# Patient Record
Sex: FEMALE | Race: BLACK OR AFRICAN AMERICAN | NOT HISPANIC OR LATINO | Employment: FULL TIME | URBAN - METROPOLITAN AREA
[De-identification: names, ages, dates, MRNs, and addresses within clinical notes are randomized per-mention and may not be internally consistent; named-entity substitution may affect disease eponyms.]

---

## 2017-02-12 PROCEDURE — 93005 ELECTROCARDIOGRAM TRACING: CPT

## 2017-02-12 PROCEDURE — 99284 EMERGENCY DEPT VISIT MOD MDM: CPT

## 2017-02-13 ENCOUNTER — HOSPITAL ENCOUNTER (EMERGENCY)
Facility: HOSPITAL | Age: 22
Discharge: HOME | End: 2017-02-13
Attending: EMERGENCY MEDICINE
Payer: MEDICAID

## 2017-02-13 VITALS
OXYGEN SATURATION: 99 % | DIASTOLIC BLOOD PRESSURE: 65 MMHG | TEMPERATURE: 98 F | HEART RATE: 84 BPM | WEIGHT: 236 LBS | BODY MASS INDEX: 37.04 KG/M2 | HEIGHT: 67 IN | SYSTOLIC BLOOD PRESSURE: 114 MMHG | RESPIRATION RATE: 17 BRPM

## 2017-02-13 DIAGNOSIS — R07.9 CHEST PAIN, UNSPECIFIED TYPE: Primary | ICD-10-CM

## 2017-02-13 RX ORDER — PANTOPRAZOLE SODIUM 20 MG/1
20 TABLET, DELAYED RELEASE ORAL DAILY
Qty: 7 TABLET | Refills: 1 | Status: SHIPPED | OUTPATIENT
Start: 2017-02-13 | End: 2017-06-12

## 2017-02-13 NOTE — ED NOTES
Rounding on the patient has been done. she has been updated on the plan of care and her current status. Pain was assessed and is currently a 6/10. Comfort positioning and restroom needs were addressed. Necessary items were placed with in her reach and she was advised when a reassessment would take place. The call bell remains at the bedside for any additional patient needs. The patient is resting comfortably on the stretcher, respirations are even and unlabored, skin warm and dry. Will continue to monitor.

## 2017-02-13 NOTE — ED PROVIDER NOTES
"Encounter Date: 2/12/2017    SCRIBE #1 NOTE: I, Obed Scruggs MD, am scribing for, and in the presence of,  Kiarra Tavera. I have scribed the following portions of the note - Other sections scribed: HPI/ROS.       History     Chief Complaint   Patient presents with    Chest Pain     left cw pain x 1 week, states laying down and move certain ways it gets worse.  Denies sob.      Review of patient's allergies indicates:  No Known Allergies  HPI Comments: CC: Chest Pain    HPI: 21 y.o. F with no PMHx presents to the ED c/o moderate, intermittent, mid-sternal CP for the past 1 week. Pain is described as a "pressure". Pain is exacerbated with certain movements such as laying on her L side. Pt took Tums with no relief. Pt reports she is still on birth control. Pt denies diaphoresis, fever, chills, SOB, abdominal pain, and N/V. Pt does not smoke.    The history is provided by the patient.     History reviewed. No pertinent past medical history.  Past Medical History Pertinent Negatives   Diagnosis Date Noted    Asthma 8/31/2015    Diabetes mellitus 8/31/2015    Hypertension 8/31/2015    Seizures 8/31/2015     History reviewed. No pertinent past surgical history.  Family History   Problem Relation Age of Onset    No Known Problems Mother     No Known Problems Father     Diabetes Brother     Cancer Maternal Grandmother     Diabetes Maternal Grandmother     Hypertension Maternal Grandmother     Breast cancer Neg Hx     Colon cancer Neg Hx     Ovarian cancer Neg Hx      Social History   Substance Use Topics    Smoking status: Never Smoker    Smokeless tobacco: Never Used    Alcohol use No     Review of Systems   Constitutional: Negative for chills, diaphoresis and fever.   HENT: Negative for congestion.    Eyes: Negative for pain.   Respiratory: Negative for cough and shortness of breath.    Cardiovascular: Positive for chest pain.   Gastrointestinal: Negative for abdominal pain, nausea and vomiting. "   Genitourinary: Negative for dysuria.   Musculoskeletal: Negative for back pain.   Skin: Negative for rash.   Neurological: Negative for headaches.       Physical Exam   Initial Vitals   BP Pulse Resp Temp SpO2   02/12/17 2348 02/12/17 2348 02/12/17 2348 02/12/17 2348 02/12/17 2348   111/58 83 17 98 °F (36.7 °C) 99 %     Physical Exam    Nursing note and vitals reviewed.  Constitutional: She appears well-developed and well-nourished. She is not diaphoretic. No distress.   HENT:   Head: Normocephalic and atraumatic.   Nose: Nose normal.   Mouth/Throat: Oropharynx is clear and moist. No oropharyngeal exudate.   Eyes: Conjunctivae and EOM are normal. Pupils are equal, round, and reactive to light. No scleral icterus.   Neck: Normal range of motion. Neck supple. No thyromegaly present. No tracheal deviation present.   Cardiovascular: Normal rate, regular rhythm and normal heart sounds. Exam reveals no gallop and no friction rub.    No murmur heard.  Pulmonary/Chest: Breath sounds normal. No respiratory distress. She has no wheezes. She has no rhonchi. She has no rales.   Abdominal: Soft. Bowel sounds are normal. She exhibits no distension and no mass. There is no tenderness. There is no rebound and no guarding.   Musculoskeletal: Normal range of motion. She exhibits no edema or tenderness.   Lymphadenopathy:     She has no cervical adenopathy.   Neurological: She is alert and oriented to person, place, and time. She has normal strength. No cranial nerve deficit or sensory deficit.   Skin: Skin is warm and dry. No rash noted. No erythema. No pallor.   Psychiatric: She has a normal mood and affect. Her behavior is normal. Thought content normal.         ED Course   Procedures  Labs Reviewed   POCT URINE PREGNANCY             Medical Decision Making:   Initial Assessment:   21-year-old male presents with upper midsternal chest pain for the last week that is exacerbated with movement and also worse at night.  She says that  sometimes this pain seems to radiate into the neck and feels like a sense of fullness, pressure, or like something stuck in her throat.  Physical examination is completely normal.  No evidence of DVT, no hypoxia, tachycardia, nl cardiopulmonary exam  Differential Diagnosis:   Very low likelihood of acute coronary syndrome, pulmonary embolism.  Will obtain screening EKG and chest x-ray.  Independently Interpreted Test(s):   I have ordered and independently interpreted X-rays - see summary below.       <> Summary of X-Ray Reading(s): Chest x-ray: No acute abnormality  I have ordered and independently interpreted EKG Reading(s) - see summary below       <> Summary of EKG Reading(s):  NSR, nl axis, nl intervals, no definite acute ischemic changes  ED Management:  Patient's workup is unremarkable.  Patient's description of her symptoms sound most similar to acid reflux.  Will prescribe PPI and recommend follow-up with primary physician. Patient counseled regarding test results, recommendations for supportive care, and need for follow-up.  Return precautions given.              Scribe Attestation:   Scribe #1: I performed the above scribed service and the documentation accurately describes the services I performed. I attest to the accuracy of the note.    Attending Attestation:           Physician Attestation for Scribe:  Physician Attestation Statement for Scribe #1: I, Obed Scruggs MD, reviewed documentation, as scribed by Kiarra Tavera in my presence, and it is both accurate and complete.                 ED Course     Clinical Impression:   The encounter diagnosis was Chest pain, unspecified type.          Obed Scruggs III, MD  02/13/17 0737

## 2017-02-13 NOTE — ED TRIAGE NOTES
Pt. Presents to ED with c/o midsteral chest pain that began a week ago, denies NVD, rates pain 6/10.

## 2017-02-13 NOTE — DISCHARGE INSTRUCTIONS
Return to the emergency department if you develop worsening pain, difficulty breathing, fainting, or for any new or worsening medical concerns.

## 2017-02-13 NOTE — ED AVS SNAPSHOT
[facility]  [address] [place] [address]               Jaimie Francis   2017  3:07 AM   ED    Description:  Female : 1995   Department:  [facility]           Your Care was Coordinated By:     Provider Role From To    Obed Scruggs III, MD Attending Provider 17 0312 --      Reason for Visit     Chest Pain           Diagnoses this Visit        Comments    Chest pain, unspecified type    -  Primary       ED Disposition     ED Disposition Condition Comment    Discharge             To Do List           Follow-up Information     Follow up with Uday Mendoza III, MD In 1 week.    Specialty:  Internal Medicine    Contact information:    [address]  [place] [facility]  [place] [id number] [phone]         These Medications        Disp Refills Start End    pantoprazole (PROTONIX) 20 MG tablet 7 tablet 1 2017    Take 1 tablet (20 mg total) by mouth once daily. - Oral    Pharmacy: [facility] #[id number] - [place] - [address] Ph #: [phone]         [facility] On Call     [facility] Care Line -  Assistance  Registered nurses in the [facility] provide clinical advisement, health education, appointment booking, and other advisory services.  Call for this free service at [phone].             Medications           Message regarding Medications     Verify the changes and/or additions to your medication regime listed below are the same as discussed with your clinician today.  If any of these changes or additions are incorrect, please notify your healthcare provider.        START taking these NEW medications        Refills    pantoprazole (PROTONIX) 20 MG tablet 1    Sig: Take 1 tablet (20 mg total) by mouth once daily.    Class: Print    Route: Oral           Verify that the below list of medications is an accurate representation of the medications you are currently  "taking.  If none reported, the list may be blank. If incorrect, please contact your healthcare provider. Carry this list with you in case of emergency.           Current Medications     levonorgestrel-ethinyl estradiol (SRONYX) 0.1-20 mg-mcg per tablet Take 1 tablet by mouth once daily.    pantoprazole (PROTONIX) 20 MG tablet Take 1 tablet (20 mg total) by mouth once daily.    phentermine (ADIPEX-P) 37.5 mg tablet Take 18.75 mg by mouth 2 (two) times daily.           Clinical Reference Information           Your Vitals Were     BP Pulse Temp Resp Height Weight    115/69 64 97.6 °F (36.4 °C) (Oral) 17 5' 7" (1.702 m) 107 kg (236 lb)    Last Period SpO2 BMI          01/03/2017 98% 36.96 kg/m2        Allergies as of 2/13/2017     No Known Allergies      Immunizations Administered on Date of Encounter - 2/13/2017     None      ED Micro, Lab, POCT     Start Ordered       Status Ordering Provider    02/13/17 0007 02/13/17 0006  POCT urine pregnancy  Once      Acknowledged       ED Imaging Orders     Start Ordered       Status Ordering Provider    02/13/17 0027 02/13/17 0026  X-Ray Chest PA And Lateral  1 time imaging      Final result         Discharge Instructions       Return to the emergency department if you develop worsening pain, difficulty breathing, fainting, or for any new or worsening medical concerns.         Discharge References/Attachments     ACID REFLUX, TIPS TO CONTROL (ENGLISH)    GERD (ADULT) (ENGLISH)      [facility] Sign-Up     Activating your [facility] account is as easy as 1-2-3!     1) Visit [email or web address], select Sign Up Now, enter this activation code and your date of birth, then select Next.  [email or web address]  Expires: 3/30/2017  5:39 AM      2) Create a username and password to use when you visit [facility] in the future and select a security question in case you lose your password and select Next.    3) Enter your e-mail address and click Sign Up!    Additional Information  If you have " questions, please e-mail [email or web address] or call [phone] to talk to our [facility] staff. Remember, [facility] is NOT to be used for urgent needs. For medical emergencies, dial 911.          [facility] complies with applicable Federal civil rights laws and does not discriminate on the basis of race, color, national origin, age, disability, or sex.        Language Assistance Services     ATTENTION: Language assistance services are available, free of charge. Please call [phone].      ATENCIÓN: Si habla español, tiene a reese disposición servicios gratuitos de asistencia lingüística. Llame al [phone].     CHÚ Ý: N?u b?n nói Ti?ng Vi?t, có các d?ch v? h? tr? ngôn ng? mi?n phí dành cho b?n. G?i s? [phone].

## 2017-06-12 PROBLEM — Z91.89 AT RISK FOR INFECTIOUS DISEASE DUE TO RECENT FOREIGN TRAVEL: Status: ACTIVE | Noted: 2017-06-12

## 2017-06-13 ENCOUNTER — TELEPHONE (OUTPATIENT)
Dept: MATERNAL FETAL MEDICINE | Facility: CLINIC | Age: 22
End: 2017-06-13

## 2017-06-13 DIAGNOSIS — Z91.89 AT RISK FOR INFECTIOUS DISEASE DUE TO RECENT FOREIGN TRAVEL: ICD-10-CM

## 2017-06-13 DIAGNOSIS — Z36.82 ENCOUNTER FOR NUCHAL TRANSLUCENCY TESTING: Primary | ICD-10-CM

## 2017-06-13 NOTE — TELEPHONE ENCOUNTER
See previously documented note.    ----- Message from Safia Stephens sent at 6/13/2017 12:49 PM CDT -----  Contact: self  Pt returned Rachel's call. Please contact her at [phone].    Thanks

## 2017-06-13 NOTE — TELEPHONE ENCOUNTER
Nurse returning patient's phone call to discuss scheduling [facility] consult requested for recent foreign travel.    Patient scheduled for appointment at [facility] on Monday 07/10/17 at 8:00 am. Patient is interested in genetic screenings so Nuchal Translucency ordered.    Patient verbalized understanding of all information received.

## 2017-10-05 DIAGNOSIS — O26.90 PREGNANCY, COMPLICATIONS OF: Primary | ICD-10-CM

## 2017-10-12 ENCOUNTER — HOSPITAL ENCOUNTER (OUTPATIENT)
Dept: PERINATAL CARE | Facility: HOSPITAL | Age: 22
Discharge: HOME | End: 2017-10-12
Attending: OBSTETRICS & GYNECOLOGY
Payer: MEDICAID

## 2017-10-12 DIAGNOSIS — O30.002 TWIN PREGNANCY, TWINS DISCORDANT IN SECOND TRIMESTER, FETUS 1 OF MULTIPLE GESTATION: ICD-10-CM

## 2017-10-12 DIAGNOSIS — O26.90 PREGNANCY, COMPLICATIONS OF: ICD-10-CM

## 2017-10-12 DIAGNOSIS — O30.039 MONOCHORIONIC DIAMNIOTIC TWIN PREGNANCY, ANTEPARTUM: Primary | ICD-10-CM

## 2017-10-12 DIAGNOSIS — O26.90 PREGNANCY, COMPLICATIONS OF: Primary | ICD-10-CM

## 2017-10-12 DIAGNOSIS — O36.5921 TWIN PREGNANCY, TWINS DISCORDANT IN SECOND TRIMESTER, FETUS 1 OF MULTIPLE GESTATION: ICD-10-CM

## 2017-10-12 DIAGNOSIS — Z36.89 ENCOUNTER FOR FETAL ANATOMIC SURVEY: ICD-10-CM

## 2017-10-12 DIAGNOSIS — O30.032 MONOCHORIONIC DIAMNIOTIC TWIN GESTATION IN SECOND TRIMESTER: ICD-10-CM

## 2017-10-12 DIAGNOSIS — O40.2XX2 POLYHYDRAMNIOS IN SECOND TRIMESTER, FETUS 2 OF MULTIPLE GESTATION: ICD-10-CM

## 2017-10-12 PROCEDURE — 76812 OB US DETAILED ADDL FETUS: CPT | Mod: 26,,, | Performed by: OBSTETRICS & GYNECOLOGY

## 2017-10-12 PROCEDURE — 76812 OB US DETAILED ADDL FETUS: CPT

## 2017-10-12 PROCEDURE — 76811 OB US DETAILED SNGL FETUS: CPT | Mod: 26,,, | Performed by: OBSTETRICS & GYNECOLOGY

## 2017-10-12 PROCEDURE — 99203 OFFICE O/P NEW LOW 30 MIN: CPT | Mod: 25,TH,S$PBB, | Performed by: OBSTETRICS & GYNECOLOGY

## 2017-10-12 PROCEDURE — 76811 OB US DETAILED SNGL FETUS: CPT

## 2017-10-13 DIAGNOSIS — O35.9XX1 SUSPECTED FETAL ABNORMALITY AFFECTING MANAGEMENT OF MOTHER, ANTEPARTUM, FETUS 1 OF MULTIPLE GESTATION: Primary | ICD-10-CM

## 2017-10-13 PROBLEM — Z30.09 FAMILY PLANNING: Status: ACTIVE | Noted: 2017-10-13

## 2017-10-13 NOTE — PROGRESS NOTES
"Indication  ========    Consultation. Twin Fetal anatomy survey.    Method  ======    Transabdominal ultrasound examination. View: Good view.    Pregnancy  =========    Twin pregnancy. Monochorionic-diamniotic. Number of fetuses: 2.    Dating  ======    Cycle: regular cycle  GA by "stated dating" 24 w + 5 d  PEDRO by "stated dating": 1/27/2018  Ultrasound examination on: 10/12/2017  GA by U/S based upon: AC, BPD, Femur, HC  GA by U/S 24 w + 1 d  PEDRO by U/S: 1/31/2018  GA by U/S based upon (Fetus 2): AC, BPD, Femur, HC  GA by U/S (Fetus 2) 25 w + 1 d  PEDRO by U/S (Fetus 2): 1/24/2018  Assigned: Dating performed on 10/12/2017, based on the external assessment  Assigned GA 24 w + 5 d  Assigned PEDRO: 1/27/2018    General Evaluation (1)  =================    Cardiac activity: present.  bpm.  Fetal movements: visualized.  Presentation: transverse RLQ.  Placenta: posterior.  Umbilical cord: normal, 3 vessel cord, pci suboptimal (likely close to dividing membrane).  Amniotic fluid: normal amount (mvp 3.9 cm).    Fetal Biometry (1)  ==============    Fetal Biometry  BPD 61.1 mm 24w 6d Hadlock  OFD 79.2 mm 25w 6d Jacki  .3 mm 24w 5d Hadlock  .4 mm 23w 4d Hadlock  Femur 41.4 mm 23w 3d Hadlock  Cerebellum tr 25.3 mm 24w 0d Pedraza  CM 4.6 mm  Humerus 39.5 mm 24w 0d Jacki   g 21% Fernando  Calculated by: Hadlock (BPD-HC-AC-FL)  EFW (lb) 1 lb  EFW (oz) 6 oz  EFW discordance 24.0 %  Cephalic index 0.77  HC / AC 1.21  FL / BPD 0.68  FL / AC 0.22  MVP 3.9 cm   bpm  Head / Face / Neck   3.1 mm  Nasal bone 5.8 mm    Fetal Anatomy (1)  ==============    Cranium: normal  Lateral ventricles: normal  Choroid plexus: normal  Midline falx: normal  Cavum septi pellucidi: normal  Cerebellum: normal  Cisterna magna: normal  Head shape: normal  Rt lateral ventricle: normal  Lt lateral ventricle: normal  Rt choroid plexus: normal  Lt choroid plexus: normal  Parenchyma: normal  Third " ventricle: normal  Posterior fossa: normal  Cerebellar lobes: normal  Vermis: normal  Neck: appears normal  Nuchal fold: normal  Lips: suboptimal  Profile: normal  Nose: suboptimal  Maxilla: normal  Mandible: normal  4-chamber view: suboptimal  RVOT: suboptimal  LVOT: suboptimal  Heart / Thorax: Septal views: Suboptimal  Situs: normal  Aortic arch: suboptimal  Ductal arch: suboptimal  SVC: suboptimal  IVC: suboptimal  3-vessel view: suboptimal  3-vessel-trachea view: suboptimal  Cardiac position: normal  Cardiac axis: normal  Cardiac size: normal  Cardiac rhythm: normal  Rt lung: normal  Lt lung: normal  Diaphragm: normal  Cord insertion: normal  Stomach: normal  Kidneys: suboptimal  Bladder: normal  Genitals: normal  Abdom. wall: appears normal  Abdom. cavity: normal  Rt kidney: suboptimal  Lt kidney: suboptimal  Liver: normal  Bowel: normal  Rt renal artery: normal  Lt renal artery: normal  Cervical spine: suboptimal  Thoracic spine: suboptimal  Lumbar spine: suboptimal  Sacral spine: suboptimal  Rt arm: normal  Lt arm: normal  Rt hand: normal  Lt hand: normal  Rt leg: normal  Lt leg: normal  Rt foot: normal  Lt foot: normal  Position of hands: normal  Position of feet: normal  Gender: male  Wants to know gender: yes    General Evaluation (2)  =================    Cardiac activity: present.  bpm.  Fetal movements: visualized.  Presentation: transverse top.  Placenta: posterior.  Umbilical cord: normal, 3 vessel cord, pci closer to fundus.  Amniotic fluid: polyhydramnios (MVP 11.2).    Fetal Biometry (2)  ==============    Fetal Biometry  BPD 61.5 mm 25w 0d Hadlock  OFD 76.8 mm 25w 1d Jacki  .3 mm 24w 3d Hadlock  .3 mm 26w 1d Hadlock  Femur 44.7 mm 24w 5d Hadlock  Cerebellum tr 27.0 mm 25w 2d Pedraza  CM 4.9 mm  Humerus 46.5 mm 27w 3d Jacki   g 59% Fernando  Calculated by: Hadlock (BPD-HC-AC-FL)  EFW (lb) 1 lb  EFW (oz) 13 oz  EFW discordance 24.0 %  Cephalic index 0.80  HC /  AC 1.03  FL / BPD 0.73  FL / AC 0.21  MVP 11.2 cm   bpm  Head / Face / Neck   3.5 mm    Fetal Anatomy (2)  ==============    Cranium: normal  Lateral ventricles: normal  Choroid plexus: normal  Midline falx: normal  Cavum septi pellucidi: normal  Cerebellum: normal  Cisterna magna: normal  Head shape: normal  Lt lateral ventricle: normal  Rt choroid plexus: normal  Lt choroid plexus: normal  Parenchyma: normal  Third ventricle: normal  Posterior fossa: normal  Cerebellar lobes: normal  Vermis: normal  Neck: appears normal  Nuchal fold: normal  Lips: normal  Profile: suboptimal  Nose: normal  Maxilla: suboptimal  Mandible: suboptimal  4-chamber view: suboptimal  RVOT: suboptimal  LVOT: suboptimal  Heart / Thorax: Septal views: Suboptimal  Situs: suboptimal  Aortic arch: normal  Ductal arch: suboptimal  SVC: suboptimal  IVC: suboptimal  3-vessel view: suboptimal  3-vessel-trachea view: suboptimal  Cardiac position: suboptimal  Cardiac axis: suboptimal  Cardiac size: suboptimal  Cardiac rhythm: normal  Rt lung: normal  Lt lung: normal  Diaphragm: normal  Cord insertion: normal  Stomach: normal  Kidneys: suboptimal  Bladder: normal  Abdom. wall: appears normal  Abdom. cavity: normal  Rt kidney: suboptimal  Lt kidney: suboptimal  Liver: normal  Bowel: normal  Rt renal artery: normal  Lt renal artery: normal  Cervical spine: normal  Thoracic spine: normal  Lumbar spine: normal  Sacral spine: normal  Rt arm: normal  Lt arm: normal  Rt hand: normal  Lt hand: normal  Rt leg: normal  Lt leg: normal  Rt foot: normal  Lt foot: normal  Position of hands: normal  Position of feet: normal  Gender: male  Wants to know gender: yes    Maternal Structures  ===============    Uterus / Cervix  Uterus: Normal  Cervical length 43.0 mm  Ovaries / Tubes / Adnexa  Rt ovary: Suboptimal  Lt ovary: Suboptimal  Pouch of Lalit / Other Structures  Cul de Sac: Visualized  Other: Uterus normal, adnexa  suboptimal    Consultation  ==========    Type: Mono/di twins.  Ms. Francis is a 23y/o  at 24 and 5 weeks based on an early ultrasound. She denies any pregnancy related complications.  She traveled to [place] in January and was tested for Zika in  with a PCR and IgM both of which were negative. She has had no other  exposure, nor has her spouse.    PMHx: Rh negative  PSHx: None  Social: Denies smoking, alcohol, illicit drug use  Meds:PNV  PObHx: 1 FTSVD (just over 8 pounds), 1   Family Hx: no history of birth defects or sickle cell disease; her grandmother had some type of cancer, diabetes, HTN  Sequential screen negative for ONTD or Trisomy 21    /65, hbg elec normal    A/P:1. IUP at 24 and 5    2. Mono/di twins:On ultrasound today a twin gestation is noted with the intertwin membrane is consistent with  monochorionic-diamniotic twin gestation. Today I discussed with the patient the finding of monochorionic-diamniotic twin pregnancy and the  risks associated with this type of pregnancy. I counseled the patient with the risks associated with twin pregnancy. These include but are not  limited to  labor and delivery,gestational diabetes, preeclampsia, IUGR and/or discordant twins, malpresentation, congenital anomalies,  postpartum hemorrhage and  delivery. The patient voiced understanding of these risks. We discussed that a twin gestation increases  the risk of nearly all  pregnancy related complications. We also discussed the increased risk of cerebral palsy in multiple gestations.  Because of the finding of a monochorionic placenta, I also discussed the 10-15% risk of twin to twin transfusion syndrome (TTTS). I reviewed  with the patient typically the need for fetal ultrasound assessment for TTTS surveillance every two weeks starting at 16-17 weeks gestation  until delivery,  with growth every 4 weeks. A detailed fetal anatomic survey is recommended. A fetal  echo is also  recommended.   fetal surveillance is recommended beginning at 32 weeks with twice weekly NSTs and weekly MVP or earlier if indicated.  I also reviewed with the patient our MFM group recommendations delivery at 37 weeks gestation because of the increasing risk for poor  pregnancy outcomes in monochorionic-diamniotic twins that progress past this gestational age. At times, earlier delivery is warranted.  Given the multiple gestation, I would recommend increasing her folic acid supplementation by at least another 1mg daily and consideration of  a dosage of 4mg total daily.  To reduce the risk of preeclampsia, I would recommend baby aspirin 81mg PO daily after 12-14 weeks of gestation.    Today, her twins are discordant although neither has IUGR. There is polyhydramnios of fetus B but a normal fluid volume for fetus A. We  discussed the need to monitor the fetuses closely for any signs of evolution of TTTS or selective IUGR. We discussed that currently she does  not meet criteria for TTTS but we do become more concerned with discrepant amniotic fluid volumes and significant discordance. We  discussed the potential need for earlier delivery and increased fetal surveillance. We also briefly reviewed treatment strategies of TTTS. Our  staff is also working on attempting to obtain an echo appointment for her. We also discussed the potential for compromise of the second fetus  if one fetus of a monochorionic/diamniotic pair results in demise. The patient is aware that at times individuals with twin gestation require  hospital admission.    3. Rh negative. Recommend Rhogam at that appropriate time frame.    4. Polyhydramnios: This can occur for various reasons including but not limited to diabetes, idiopathic causes, chromosomal abnormalities,  genetic disorders, structural malformations, neuromuscular conditions, infections, and other conditions. There is a higher risk of   delivery, PPROM, abruption and  adverse pregnancy outcomes. Recommend GDM screening.    Dr. Rosario was notified.    A total of 30 minutes was spent in face to face time with greater than half of that time spent in counseling and coordination of care.            Impression  =========    Live monochorionic/diamniotic twin intrauterine gestation.  Fetus A with normal amniotic fluid volume. Fetus B with polyhydramnios.  Fetus A with EFW at the 21st percentile. Fetus B with EFW at the 59th percentile.  Discordance of 24%.  Much of the fetal anatomy was suboptimally visualized. The fetal anatomy that was seen appeared normal.  Both fetal bladders and stomachs were visualized and there is no evidence of fetal hydrops noted. No current evidence of TTTS.  Placenta is posterior without previa.  Transabdominal cervical length is normal.      Recommendation  ==============    Follow up ultrasound next week due to fluid discrepancy to confirm no evolution of TTTS. Dopplers if needed.  GDM screen with primary ob.  Fetal echo (MFM is scheduling).

## 2017-10-18 ENCOUNTER — OFFICE VISIT (OUTPATIENT)
Dept: PEDIATRIC CARDIOLOGY | Facility: CLINIC | Age: 22
End: 2017-10-18
Attending: PEDIATRICS
Payer: MEDICAID

## 2017-10-18 ENCOUNTER — CLINICAL SUPPORT (OUTPATIENT)
Dept: PEDIATRIC CARDIOLOGY | Facility: CLINIC | Age: 22
End: 2017-10-18
Payer: MEDICAID

## 2017-10-18 ENCOUNTER — OFFICE VISIT (OUTPATIENT)
Dept: MATERNAL FETAL MEDICINE | Facility: CLINIC | Age: 22
End: 2017-10-18
Payer: MEDICAID

## 2017-10-18 VITALS
HEIGHT: 67 IN | DIASTOLIC BLOOD PRESSURE: 70 MMHG | SYSTOLIC BLOOD PRESSURE: 120 MMHG | BODY MASS INDEX: 38.65 KG/M2 | WEIGHT: 246.25 LBS | HEART RATE: 105 BPM

## 2017-10-18 DIAGNOSIS — O30.039 MONOCHORIONIC DIAMNIOTIC TWIN PREGNANCY, ANTEPARTUM: ICD-10-CM

## 2017-10-18 DIAGNOSIS — O26.90 PREGNANCY, COMPLICATIONS OF: ICD-10-CM

## 2017-10-18 DIAGNOSIS — O35.9XX1 SUSPECTED FETAL ABNORMALITY AFFECTING MANAGEMENT OF MOTHER, ANTEPARTUM, FETUS 1 OF MULTIPLE GESTATION: ICD-10-CM

## 2017-10-18 DIAGNOSIS — O30.032 MONOCHORIONIC DIAMNIOTIC TWIN GESTATION IN SECOND TRIMESTER: Primary | ICD-10-CM

## 2017-10-18 PROCEDURE — 76827 ECHO EXAM OF FETAL HEART: CPT | Mod: PBBFAC | Performed by: PEDIATRICS

## 2017-10-18 PROCEDURE — 76811 OB US DETAILED SNGL FETUS: CPT | Mod: PBBFAC | Performed by: OBSTETRICS & GYNECOLOGY

## 2017-10-18 PROCEDURE — 99212 OFFICE O/P EST SF 10 MIN: CPT | Mod: S$PBB,25,TH, | Performed by: OBSTETRICS & GYNECOLOGY

## 2017-10-18 PROCEDURE — 99203 OFFICE O/P NEW LOW 30 MIN: CPT | Mod: 25,S$PBB,, | Performed by: PEDIATRICS

## 2017-10-18 PROCEDURE — 99212 OFFICE O/P EST SF 10 MIN: CPT | Mod: PBBFAC,25 | Performed by: PEDIATRICS

## 2017-10-18 PROCEDURE — 76816 OB US FOLLOW-UP PER FETUS: CPT | Mod: 59,PBBFAC | Performed by: OBSTETRICS & GYNECOLOGY

## 2017-10-18 PROCEDURE — 99999 PR PBB SHADOW E&M-EST. PATIENT-LVL II: CPT | Mod: PBBFAC,,, | Performed by: PEDIATRICS

## 2017-10-18 PROCEDURE — 76827 ECHO EXAM OF FETAL HEART: CPT | Mod: 26,S$PBB,, | Performed by: PEDIATRICS

## 2017-10-18 PROCEDURE — 93325 DOPPLER ECHO COLOR FLOW MAPG: CPT | Mod: 59,PBBFAC | Performed by: PEDIATRICS

## 2017-10-18 PROCEDURE — 76825 ECHO EXAM OF FETAL HEART: CPT | Mod: 26,S$PBB,, | Performed by: PEDIATRICS

## 2017-10-18 PROCEDURE — 76825 ECHO EXAM OF FETAL HEART: CPT | Mod: 59,PBBFAC | Performed by: PEDIATRICS

## 2017-10-18 PROCEDURE — 76812 OB US DETAILED ADDL FETUS: CPT | Mod: PBBFAC | Performed by: OBSTETRICS & GYNECOLOGY

## 2017-10-18 PROCEDURE — 76816 OB US FOLLOW-UP PER FETUS: CPT | Mod: 26,S$PBB,, | Performed by: OBSTETRICS & GYNECOLOGY

## 2017-10-18 PROCEDURE — 93325 DOPPLER ECHO COLOR FLOW MAPG: CPT | Mod: 26,S$PBB,, | Performed by: PEDIATRICS

## 2017-10-18 NOTE — LETTER
October 21, 2017      Kelsey Oglesby MD  [address]  [address]  [place] [address]           [facility]  [address]  [facility] [address]  Phone: [phone]          Patient: Jaimie Francis   MR Number: [id number]   YOB: 1995   Date of Visit: 10/18/2017       Dear Dr. Kelsey Oglesby:    Thank you for referring Jaimie Francis to me for evaluation. Attached you will find relevant portions of my assessment and plan of care.    If you have questions, please do not hesitate to call me. I look forward to following Jaimie Francis along with you.    Sincerely,    Kelsey Cheatham MD    Enclosure  CC:  No Recipients    If you would like to receive this communication electronically, please contact [email or web address] or [phone] to request more information on [facility] Link access.    For providers and/or their staff who would like to refer a patient to [facility], please contact us through our one-stop-shop provider referral line, [facility], at [phone].    If you feel you have received this communication in error or would no longer like to receive these types of communications, please e-mail [email or web address]

## 2017-10-21 NOTE — PROGRESS NOTES
"Indication  ========    TTTS/check.    History  ======    General History  Height 165 cm  Height (ft) 5 ft  Height (in) 5 in  Other: OB: DESIREE SHOOK  Risk Factors  History risk factors: Multiple gestation  Details: mo-di twins    Pregnancy History  ==============    Maternal Lab Tests  Test: Sequential Screen part 2  Result: negative DSR <1:5000 (age DSR 1:1100)  Wants to know gender: yes    Maternal Assessment  =================    Weight 111 kg  Weight (lb) 245 lb  Height 165 cm  Height (ft) 5 ft  Height (in) 5 in  BP syst 120 mmHg  BP diast 72 mmHg  BMI 40.72 kg/m²    Method  ======    Transabdominal ultrasound examination, [place] E10. View: Sufficient.    Pregnancy  =========    Twin pregnancy. Monochorionic-diamniotic. Number of fetuses: 2.    Dating  ======    Cycle: regular cycle  GA by "stated dating" 25 w + 4 d  PEDRO by "stated dating": 1/27/2018  Assigned: Dating performed on 10/12/2017, based on the external assessment  Assigned GA 25 w + 4 d  Assigned PEDRO: 1/27/2018    General Evaluation (1)  =================    Cardiac activity:  bpm.  Fetal movements: visualized.  Presentation: cephalic maternal left, presenting twin.  Placenta:  Placental site: posterior.  Umbilical cord: Cord vessels: 3 vessel cord.  Amniotic fluid: Amount of AF: normal amount. MVP 4.5 cm. ESTRELLITA 4.2 cm. Q1 4.2 cm.    Fetal Biometry (1)  ==============    Fetal Biometry  Calculated by: Hadlock (BPD-HC-AC-FL)  MVP 4.5 cm  ESTRELLITA 4.2 cm   bpm    Fetal Anatomy (1)  ==============    RVOT: suboptimal  LVOT: suboptimal  Heart / Thorax: Fetal echo done 10/18/17  4-chamber view: septum normal, 4-chamber normal  Aortic arch: suboptimal  Ductal arch: suboptimal  SVC: suboptimal  IVC: suboptimal  3-vessel view: suboptimal  3-vessel-trachea view: suboptimal  Stomach: visualized  Kidneys: visualized  Bladder: visualized  Cervical spine: suboptimal  Thoracic spine: suboptimal  Lumbar spine: suboptimal  Sacral " spine: suboptimal  Gender: male  Wants to know gender: yes  Other: A full anatomic survey has been previously performed.    General Evaluation (2)  =================    Cardiac activity: present.  bpm.  Fetal movements: visualized.  Presentation: breech oblique maternal RUQ.  Placenta:  Placental site: posterior.  Umbilical cord: Cord vessels: 3 vessel cord.  Amniotic fluid: MVP 8.8 cm. ESTRELLITA 4.2 cm. Q1 4.2 cm.    Fetal Biometry (2)  ==============    Fetal Biometry  Calculated by: Hadlock (BPD-HC-AC-FL)  MVP 8.8 cm  ESTRELLITA 4.2 cm   bpm    Fetal Anatomy (2)  ==============    Profile: normal  Maxilla: normal  Mandible: normal  RVOT: suboptimal  LVOT: suboptimal  Heart / Thorax: Fetal echo done 10/18/17  4-chamber view: 4-chamber suboptimal, septum suboptimal  Situs: normal  Aortic arch: suboptimal  Ductal arch: suboptimal  SVC: suboptimal  IVC: suboptimal  3-vessel view: suboptimal  3-vessel-trachea view: suboptimal  Cardiac axis: normal  Cardiac size: normal  Stomach: visualized  Kidneys: visualized  Bladder: visualized  Genitals: normal  Gender: male  Wants to know gender: yes  Other: A full anatomic survey has been previously performed.    Consultation  ==========    We discussed the findings of today's ultrasound.  There is no evidence of TTTS, but the growth is discordant and Twin B has polyhydramnios.  We discussed close monitoring with weekly ultrasound to assess for evolution of possible TTTS.  She has been previously counseled regarding selective IUGR, polyhydramnios, TTTS, and the complications if one pair of a MC/DA twin suffers  demise.  Recommend GDM screen with primary OB.  Per patient, fetal echocardiograms performed today. Could not rule out VSD for twin B. Has follow-up scheduled with pediatric cardiology.  Time  I overall spent approximately 10 minutes in face to face time with the patient, greater than 50% of which was in counseling and care  coordination.    Impression  =========    F/u  TTTS surveillance for monochorionic diamniotic twins with discordant growth  Twin A  Imaging of the cardiac structures, spine, and face remain suboptimal due to fetal position.  The bladder was visualized. The AFV is normal.  Twin B  Imaging of the cardiac structures was suboptimal. The remainder of the previously suboptimal anatomy was seen today and appears normal.  The bladder was visualized. There is mild polyhydramnios.  No evidence of TTTS.    Recommendation  ==============    Recommend repeat ultrasound in 1 week for TTTS surveillance.

## 2017-10-27 ENCOUNTER — OFFICE VISIT (OUTPATIENT)
Dept: MATERNAL FETAL MEDICINE | Facility: CLINIC | Age: 22
End: 2017-10-27
Payer: MEDICAID

## 2017-10-27 VITALS
WEIGHT: 246.69 LBS | SYSTOLIC BLOOD PRESSURE: 118 MMHG | DIASTOLIC BLOOD PRESSURE: 70 MMHG | BODY MASS INDEX: 38.63 KG/M2

## 2017-10-27 DIAGNOSIS — O30.039 MONOCHORIONIC DIAMNIOTIC TWIN PREGNANCY, ANTEPARTUM: ICD-10-CM

## 2017-10-27 PROCEDURE — 76816 OB US FOLLOW-UP PER FETUS: CPT | Mod: 59,PBBFAC | Performed by: OBSTETRICS & GYNECOLOGY

## 2017-10-27 PROCEDURE — 99212 OFFICE O/P EST SF 10 MIN: CPT | Mod: S$PBB,25,TH, | Performed by: OBSTETRICS & GYNECOLOGY

## 2017-10-27 PROCEDURE — 76812 OB US DETAILED ADDL FETUS: CPT | Mod: 26,S$PBB,, | Performed by: OBSTETRICS & GYNECOLOGY

## 2017-10-27 PROCEDURE — 76811 OB US DETAILED SNGL FETUS: CPT | Mod: PBBFAC | Performed by: OBSTETRICS & GYNECOLOGY

## 2017-10-27 PROCEDURE — 76811 OB US DETAILED SNGL FETUS: CPT | Mod: 26,S$PBB,, | Performed by: OBSTETRICS & GYNECOLOGY

## 2017-10-27 PROCEDURE — 76812 OB US DETAILED ADDL FETUS: CPT | Mod: PBBFAC | Performed by: OBSTETRICS & GYNECOLOGY

## 2017-10-27 PROCEDURE — 99212 OFFICE O/P EST SF 10 MIN: CPT | Mod: PBBFAC | Performed by: OBSTETRICS & GYNECOLOGY

## 2017-10-27 PROCEDURE — 99999 PR PBB SHADOW E&M-EST. PATIENT-LVL II: CPT | Mod: PBBFAC,,, | Performed by: OBSTETRICS & GYNECOLOGY

## 2017-10-28 NOTE — PROGRESS NOTES
"Indication  ========    TTTS surveillence.    History  ======    General History  Height 165 cm  Height (ft) 5 ft  Height (in) 5 in  Other: OB: DESIREE SHOOK  Risk Factors  History risk factors: Multiple gestation  Details: mo-di twins    Pregnancy History  ==============    Maternal Lab Tests  Test: Sequential Screen part 2  Result: negative DSR <1:5000 (age DSR 1:1100)  Wants to know gender: yes    Maternal Assessment  =================    Weight 111 kg  Weight (lb) 245 lb  Height 165 cm  Height (ft) 5 ft  Height (in) 5 in  BP syst 118 mmHg  BP diast 70 mmHg  BMI 40.72 kg/m²    Method  ======    Transabdominal ultrasound examination. View: Sufficient.    Pregnancy  =========    Twin pregnancy. Monochorionic-diamniotic. Number of fetuses: 2.    Dating  ======    Cycle: regular cycle  GA by "stated dating" 26 w + 6 d  PEDRO by "stated dating": 1/27/2018  Assigned: Dating performed on 10/12/2017, based on the external assessment  Assigned GA 26 w + 6 d  Assigned PEDRO: 1/27/2018    General Evaluation (1)  =================    Cardiac activity: present.  bpm.  Fetal movements: visualized.  Presentation: cephalic/ mat rlq.  Placenta: posterior.  Umbilical cord: 3 vessel cord.  Amniotic fluid: MVP 2.9 cm.    Fetal Biometry (1)  ==============    Fetal Biometry  Calculated by: Hadlock (BPD-HC-AC-FL)  MVP 2.9 cm   bpm    Fetal Anatomy (1)  ==============    4-chamber view: normal  Stomach: normal  Kidneys: normal  Bladder: normal  Gender: male  Wants to know gender: yes    General Evaluation (2)  =================    Cardiac activity: present.  bpm.  Fetal movements: visualized.  Presentation: cephalic/ mat luq.  Amniotic fluid: MVP 9.2 cm.    Fetal Biometry (2)  ==============    Fetal Biometry  Calculated by: Hadgerardo (BPD-HC-AC-FL)  MVP 9.2 cm   bpm  Head / Face / Neck   3.3 mm    Fetal Anatomy " (2)  ==============    Cranium: normal  Stomach: normal  Kidneys: normal  Bladder: normal  Gender: male  Wants to know gender: yes    Consultation  ==========    Return MD visit  Reports contractions yesterday, more than 6 an hour and vaginal pressure. No LOF or Vb.  SVE closed.    Discussed risks of twins with discordant growth including risk of TTTS or selective IUGR. No evidence of IUGR currently, discussed needs  growth scan in 1 week to re-evaluate and will  accordingly.    Has follow-up fetal echocardiogram scheduled.    Time  I overall spent approximately 10 minutes in face to face time with the patient, greater than 50% of which was in counseling and care  coordination.    Impression  =========    F/u TTTS surveillance for monochorionic diamniotic twins with discordant growth  Twin A  The anatomic survey remains incomplete due to fetal position.  The bladder was visualized. The MVP is normal.  Twin B  The anatomic survey remains incomplete due to fetal position.  The bladder was visualized. There is mild polyhydramnios.  No evidence of TTTS.    Recommendation  ==============    Repeat growth scan in 1 week.

## 2017-11-03 ENCOUNTER — OFFICE VISIT (OUTPATIENT)
Dept: MATERNAL FETAL MEDICINE | Facility: CLINIC | Age: 22
End: 2017-11-03
Payer: MEDICAID

## 2017-11-03 VITALS
DIASTOLIC BLOOD PRESSURE: 70 MMHG | SYSTOLIC BLOOD PRESSURE: 130 MMHG | WEIGHT: 244.06 LBS | BODY MASS INDEX: 38.21 KG/M2

## 2017-11-03 DIAGNOSIS — O26.93 COMPLICATION OF PREGNANCY IN THIRD TRIMESTER: ICD-10-CM

## 2017-11-03 DIAGNOSIS — O26.90 PREGNANCY, COMPLICATIONS OF: ICD-10-CM

## 2017-11-03 DIAGNOSIS — O30.033 MONOCHORIONIC DIAMNIOTIC TWIN GESTATION IN THIRD TRIMESTER: ICD-10-CM

## 2017-11-03 PROCEDURE — 76816 OB US FOLLOW-UP PER FETUS: CPT | Mod: PBBFAC | Performed by: OBSTETRICS & GYNECOLOGY

## 2017-11-03 PROCEDURE — 99212 OFFICE O/P EST SF 10 MIN: CPT | Mod: PBBFAC | Performed by: OBSTETRICS & GYNECOLOGY

## 2017-11-03 PROCEDURE — 76816 OB US FOLLOW-UP PER FETUS: CPT | Mod: 26,S$PBB,, | Performed by: OBSTETRICS & GYNECOLOGY

## 2017-11-03 PROCEDURE — 99499 UNLISTED E&M SERVICE: CPT | Mod: S$PBB,,, | Performed by: OBSTETRICS & GYNECOLOGY

## 2017-11-03 PROCEDURE — 99999 PR PBB SHADOW E&M-EST. PATIENT-LVL II: CPT | Mod: PBBFAC,,, | Performed by: OBSTETRICS & GYNECOLOGY

## 2017-11-03 NOTE — PROGRESS NOTES
"Indication  ========    Twin, Evaluation of fetal growth.    History  ======    General History  Height 165 cm  Height (ft) 5 ft  Height (in) 5 in  Other: OB: DESIREE SHOOK  Risk Factors  History risk factors: Multiple gestation  Details: mo-di twins    Pregnancy History  ==============    Maternal Lab Tests  Test: Sequential Screen part 2  Result: negative DSR <1:5000 (age DSR 1:1100)  Wants to know gender: yes    Maternal Assessment  =================    Height 165 cm  Height (ft) 5 ft  Height (in) 5 in    Method  ======    Transabdominal ultrasound examination.    Pregnancy  =========    Beltre pregnancy. Monochorionic-diamniotic. Number of fetuses: 2.    Dating  ======    Cycle: regular cycle  GA by "stated dating" 27 w + 6 d  PEDRO by "stated dating": 1/27/2018  Ultrasound examination on: 11/3/2017  GA by U/S based upon: AC, BPD, Femur, HC  GA by U/S 27 w + 2 d  PEDRO by U/S: 1/31/2018  GA by U/S based upon (Fetus 2): AC, BPD, Femur, HC  GA by U/S (Fetus 2) 28 w + 1 d  PEDRO by U/S (Fetus 2): 1/25/2018  Assigned: Dating performed on 10/12/2017, based on the external assessment  Assigned GA 27 w + 6 d  Assigned PEDRO: 1/27/2018    General Evaluation (1)  =================    Cardiac activity: present.  bpm.  Fetal movements: visualized.  Presentation: cephalic, LLQ.  Placenta: posterior.  Umbilical cord: 3 vessel cord.  Amniotic fluid: Amount of AF: normal amount. MVP 4.4 cm.    Fetal Biometry (1)  ==============    Fetal Biometry  BPD 70.5 mm 28w 2d Hadlock  OFD 83.8 mm 27w 1d Jacki  .3 mm 27w 0d Hadlock  .2 mm 25w 5d Hadlock  Femur 53.2 mm 28w 2d Hadlock   g 28% Fernando  Calculated by: Hadlock (BPD-HC-AC-FL)  EFW (lb) 2 lb  EFW (oz) 3 oz  EFW discordance 17.7 %  Cephalic index 0.84  HC / AC 1.18  FL / BPD 0.75  FL / AC 0.25  MVP 4.4 cm   bpm    Fetal Anatomy (1)  ==============    Cranium: normal  Posterior fossa: normal  Lips: normal  Profile: normal  Nose: normal  4-chamber " view: normal  Heart / Thorax: patient seen by pediatric cardiology  Stomach: normal  Kidneys: normal  Bladder: normal  Cervical spine: normal  Thoracic spine: normal  Lumbar spine: normal  Sacral spine: normal  Gender: male  Wants to know gender: yes    Fetal Doppler (1)  =============    Umbilical Cord  Umbilical A PS -63.26 cm/s  Umbilical A ED 14.90 cm/s    General Evaluation (2)  =================    Cardiac activity: present.  bpm.  Fetal movements: visualized.  Presentation: cephalic, RUQ.  Placenta: posterior.  Umbilical cord: 3 vessel cord.  Amniotic fluid: Amount of AF: normal amount. MVP 6.7 cm.    Fetal Biometry (2)  ==============    Fetal Biometry  BPD 70.5 mm 28w 2d Hadlock  OFD 87.2 mm 28w 0d Ajcki  .7 mm 27w 2d Hadlock  .6 mm 28w 2d Hadlock  Femur 54.2 mm 28w 5d Hadlock  EFW 1,206 g 52% Fernando  Calculated by: Hadlock (BPD-HC-AC-FL)  EFW (lb) 2 lb  EFW (oz) 11 oz  EFW discordance 17.7 %  Cephalic index 0.81  HC / AC 1.05  FL / BPD 0.77  FL / AC 0.23  MVP 6.7 cm   bpm    Fetal Anatomy (2)  ==============    Cranium: normal  Posterior fossa: normal  Heart / Thorax: patient seen by pediatric cardiology  Stomach: normal  Kidneys: normal  Bladder: normal  Gender: male  Wants to know gender: yes    Impression  =========    Monochorionic-Diamniotic twin gestation  Twin A: The interval fetal growth has been appropriate and the EFW plots at the 28th percentile.  The MVP is normal.  The bladder is visualized.  The anatomic survey was completed today for twin A. No abnormalities are visualized.  Twin B: The interval fetal growth has been appropriate and the EFW plots at the 52nd percentile for gestational age.  The MVP is normal.  The bladder is visualized.  The intertwin discordance is 17.7%.    Recommendation  ==============    Recommend weekly ultrasounds for TTTS surveillance due to previously discordant growth with polyhydramnios of twin B (improved today).  Recommend repeat  growth ultrasound in 3 weeks.  Repeat fetal echocardiograms on 11/17 per pediatric cardiology (could not rule out VSD in twin B).

## 2017-11-03 NOTE — LETTER
November 3, 2017      Chayo Rosario MD  [address]  [address]  [place] [address]           [facility]  [address]  [facility] [address]  Phone: [phone]          Patient: Jaimie Francis   MR Number: [id number]   YOB: 1995   Date of Visit: 11/3/2017       Dear Dr. Chayo Rosario:    Thank you for referring Jaimie Francis to me for evaluation. Attached you will find relevant portions of my assessment and plan of care.    If you have questions, please do not hesitate to call me. I look forward to following Jaimie Francis along with you.    Sincerely,    Kelsey Cheatham MD    Enclosure  CC:  No Recipients    If you would like to receive this communication electronically, please contact [email or web address] or [phone] to request more information on [employer] access.    For providers and/or their staff who would like to refer a patient to [facility], please contact us through our one-stop-shop provider referral line, [facility], at [phone].    If you feel you have received this communication in error or would no longer like to receive these types of communications, please e-mail [email or web address]

## 2017-11-07 NOTE — PROGRESS NOTES
[facility] Fetal Cardiology [facility]    Today, I had the pleasure of evaluating Jaimie Francis who is now 22 y.o. and carrying her third pregnancy at 25 4/7 weeks gestation with an PEDRO of 18. She was referred for evaluation of the fetal heart due to monochorionic-diamniotic twin gestation with no evidence of TTTS, but anatomy scan has demonstrated that the growth is discordant and Twin B has polyhydramnios.      She is carrying two male fetused, named Al and Jt.  She has felt the babies move.       Obstetric History:    A1.  Her OB history is otherwise unremarkable.     History reviewed. No pertinent past medical history.      Current Outpatient Prescriptions:     ondansetron (ZOFRAN, AS HYDROCHLORIDE,) 4 MG tablet, Take 1 tablet (4 mg total) by mouth daily as needed for Nausea., Disp: 30 tablet, Rfl: 1    prenatal vit#103-iron-FA () 27 mg iron- 1 mg Tab, Take 1 tablet by mouth once daily., Disp: 30 tablet, Rfl: 11    ranitidine (ZANTAC) 300 MG tablet, Take 1 tablet (300 mg total) by mouth nightly., Disp: 30 tablet, Rfl: 1     Review of patient's allergies indicates:  No Known Allergies    Family History: Negative for congenital heart disease, early coronary artery disease, sudden unexplained death, connective tissues disorders, genetic syndromes, multiple miscarriages or other congenital anomalies.    Social History: Ms. Jaimie Francis is  to the father of the baby.  The father of the baby is involved.     FETAL ECHOCARDIOGRAM (summary):  Twin A:  Study limited by fetal position and activity.  1. No chamber enlargement.  2. Nor valvular dysfunction.  3. There is mild flow acceleration through the ductus arteriosus with a peak velocity of 1.25 m/sec. The aortic arch appears patent by 2D but was not well visualized by color Doppler.  4. Qualitatively normal biventricular size and systolic function.  5. There is no pericardial effusion.    Twin B:  Study limited by  fetal position and activity.  1. Right superior vena cava to right atrium. Inferior vena cava not well visualized.  2. No chamber enlargement.  3. No valvular dysfunction.  4. The appears to be a perimembranous ventricular septal defect by 2D, no shunting demonstrated.  5. There is mild flow acceleration through the ductus arteriosus with a peak velocity of 1.5 m/sec. The aortic arch is intact with head/neck vessels confirmed by color Doppler  6. Qualitatively biventricular size and systolic function.  7. There is no pericardial effusion.  (Full report in electronic medical record)    Impression:  Twin male fetuses at 25 4/7 wga with no evidence of TTTS and possible perimembranous VSD in Twin B with images limited by fetal position.     I discussed with mom, using diagrams, the possible diagnosis of perimembranous VSD. This sort of defect usually requires surgical repair at 4-6 months of age. It tends to be symptomatic with tachypnea and difficulty with feeding and weight gain that develops at approximately 6 weeks of age. There are medications to treat the symptoms until surgery can happed. She understands that the diagnosis is not definite given the technical limitations today. We will repeat the study in 6 weeks but are happy to see her sooner if there are any concerns from [facility].        I discussed with her that fetal echocardiography is insufficiently sensitive to rule out all septal defects, anomalies of pulmonary and systemic veins, arch anomalies, and some valvar abnormalities, nor can it ensure that the ductus arteriosus and foramen ovale will spontaneously close.     Recommendations:  Repeat fetal echo in 6 weeks.         The above information was discussed in detail including the use of diagrams, 30 minutes were used for the evaluation with half of that time in discussion and counseling.

## 2017-11-09 ENCOUNTER — OFFICE VISIT (OUTPATIENT)
Dept: MATERNAL FETAL MEDICINE | Facility: CLINIC | Age: 22
End: 2017-11-09
Payer: MEDICAID

## 2017-11-09 DIAGNOSIS — O30.039 MONOCHORIONIC DIAMNIOTIC TWIN PREGNANCY, ANTEPARTUM: ICD-10-CM

## 2017-11-09 PROCEDURE — 76815 OB US LIMITED FETUS(S): CPT | Mod: 59,PBBFAC | Performed by: OBSTETRICS & GYNECOLOGY

## 2017-11-09 PROCEDURE — 76815 OB US LIMITED FETUS(S): CPT | Mod: 26,S$PBB,, | Performed by: OBSTETRICS & GYNECOLOGY

## 2017-11-09 PROCEDURE — 99499 UNLISTED E&M SERVICE: CPT | Mod: S$PBB,,, | Performed by: OBSTETRICS & GYNECOLOGY

## 2017-11-09 NOTE — PROGRESS NOTES
"OB Ultrasound Report (see PDF version under imaging tab)    Indication  ========    TTTS Surveillance  .    History  ======    General History  Height 165 cm  Height (ft) 5 ft  Height (in) 5 in  Other: OB: DESIREE DALYELOW  Risk Factors  History risk factors: Multiple gestation  Details: mo-di twins    Pregnancy History  ===============    Maternal Lab Tests  Test: Sequential Screen part 2  Result: negative DSR <1:5000 (age DSR 1:1100)  Wants to know gender: yes    Maternal Assessment  ================    Height 165 cm  Height (ft) 5 ft  Height (in) 5 in    Method  ======    Transabdominal ultrasound examination. View: Good view.    Pregnancy  =========    Beltre pregnancy. Monochorionic-diamniotic. Number of fetuses: 2.    Dating  ======    Cycle: regular cycle  GA by "stated dating" 28 w + 5 d  PEDRO by "stated dating": 1/27/2018  Assigned: Dating performed on 10/12/2017, based on the external assessment  Assigned GA 28 w + 5 d  Assigned PEDRO: 1/27/2018    General Evaluation (1)  =================    Cardiac activity: present.  bpm.  Fetal movements: visualized.  Presentation: cephalic, midline/maternal left.  Placenta:  Placental site: posterior.  Umbilical cord: Cord vessels: 3 vessel cord.  Amniotic fluid: MVP 3.5 cm.    Fetal Biometry (1)  ==============    Fetal Biometry  Calculated by: Hadlock (BPD-HC-AC-FL)  MVP 3.5 cm   bpm    Fetal Anatomy (1)  ==============    Stomach: normal  Kidneys: normal  Bladder: normal  Gender: male  Wants to know gender: yes    General Evaluation (2)  =================    Cardiac activity: present.  bpm.  Fetal movements: visualized.  Presentation: oblique, RUQ.  Amniotic fluid: MVP 3.6 cm.    Fetal Biometry (2)  ==============    Fetal Biometry  Calculated by: Hadlock (BPD-HC-AC-FL)  MVP 3.6 cm   bpm    Fetal Anatomy (2)  ==============    Stomach: normal  Kidneys: normal  Bladder: normal  Gender: male  Wants to know " gender: yes    Impression  =========    1. MC/DA twins - 28 and 5/7 weeks  2. No evidence of twin-twin transfusion syndrome (TTTS).    Recommendation  ==============    Continue q 2 week surveillance for TTTS and q 4 week assessment of fetal growth.

## 2017-11-15 ENCOUNTER — HOSPITAL ENCOUNTER (OUTPATIENT)
Dept: OBSTETRICS AND GYNECOLOGY | Facility: HOSPITAL | Age: 22
Discharge: HOME | End: 2017-11-15
Attending: OBSTETRICS & GYNECOLOGY
Payer: MEDICAID

## 2017-11-15 VITALS
HEART RATE: 73 BPM | SYSTOLIC BLOOD PRESSURE: 115 MMHG | TEMPERATURE: 99 F | RESPIRATION RATE: 18 BRPM | DIASTOLIC BLOOD PRESSURE: 58 MMHG

## 2017-11-15 DIAGNOSIS — Z34.83 PRENATAL CARE, SUBSEQUENT PREGNANCY, THIRD TRIMESTER: ICD-10-CM

## 2017-11-15 LAB — INJECT RH IG VOL PATIENT: NORMAL ML

## 2017-11-15 PROCEDURE — 63600519 RHOGAM PHARM REV CODE 636 ALT 250 W HCPCS: Performed by: OBSTETRICS & GYNECOLOGY

## 2017-11-15 PROCEDURE — 96372 THER/PROPH/DIAG INJ SC/IM: CPT

## 2017-11-15 RX ADMIN — HUMAN RHO(D) IMMUNE GLOBULIN 300 MCG: 300 INJECTION, SOLUTION INTRAMUSCULAR at 03:11

## 2017-11-16 ENCOUNTER — OFFICE VISIT (OUTPATIENT)
Dept: MATERNAL FETAL MEDICINE | Facility: CLINIC | Age: 22
End: 2017-11-16
Attending: OBSTETRICS & GYNECOLOGY
Payer: MEDICAID

## 2017-11-16 DIAGNOSIS — O30.039 MONOCHORIONIC DIAMNIOTIC TWIN PREGNANCY, ANTEPARTUM: ICD-10-CM

## 2017-11-16 PROCEDURE — 76815 OB US LIMITED FETUS(S): CPT | Mod: 26,S$PBB,, | Performed by: OBSTETRICS & GYNECOLOGY

## 2017-11-16 PROCEDURE — 76815 OB US LIMITED FETUS(S): CPT | Mod: 59,PBBFAC | Performed by: OBSTETRICS & GYNECOLOGY

## 2017-11-16 PROCEDURE — 99499 UNLISTED E&M SERVICE: CPT | Mod: S$PBB,,, | Performed by: OBSTETRICS & GYNECOLOGY

## 2017-11-16 NOTE — LETTER
November 19, 2017      Kelsey Oglesby MD  [address]  [address]  [place] [address]           [facility]  [address]  [place] [address]  Phone: [phone]          Patient: Jaimie Francis   MR Number: [id number]   YOB: 1995   Date of Visit: 11/16/2017       Dear Dr. Kelsey Oglesby:    Thank you for referring Jaimie Francis to me for evaluation. Attached you will find relevant portions of my assessment and plan of care.    If you have questions, please do not hesitate to call me. I look forward to following Jaimie Francis along with you.    Sincerely,    Ryanne Talley MD    Enclosure  CC:  No Recipients    If you would like to receive this communication electronically, please contact [email or web address] or [phone] to request more information on [employer] access.    For providers and/or their staff who would like to refer a patient to [facility], please contact us through our one-stop-shop provider referral line, [facility], at [phone].    If you feel you have received this communication in error or would no longer like to receive these types of communications, please e-mail [email or web address]

## 2017-11-17 ENCOUNTER — CLINICAL SUPPORT (OUTPATIENT)
Dept: PEDIATRIC CARDIOLOGY | Facility: CLINIC | Age: 22
End: 2017-11-17
Attending: PEDIATRICS
Payer: MEDICAID

## 2017-11-17 ENCOUNTER — CLINICAL SUPPORT (OUTPATIENT)
Dept: PEDIATRIC CARDIOLOGY | Facility: CLINIC | Age: 22
End: 2017-11-17
Payer: MEDICAID

## 2017-11-17 VITALS
HEIGHT: 65 IN | DIASTOLIC BLOOD PRESSURE: 60 MMHG | WEIGHT: 242.94 LBS | BODY MASS INDEX: 40.48 KG/M2 | HEART RATE: 80 BPM | SYSTOLIC BLOOD PRESSURE: 112 MMHG

## 2017-11-17 DIAGNOSIS — O30.032 MONOCHORIONIC DIAMNIOTIC TWIN GESTATION IN SECOND TRIMESTER: ICD-10-CM

## 2017-11-17 DIAGNOSIS — O36.5921 TWIN PREGNANCY, TWINS DISCORDANT IN SECOND TRIMESTER, FETUS 1 OF MULTIPLE GESTATION: ICD-10-CM

## 2017-11-17 DIAGNOSIS — O30.002 TWIN PREGNANCY, TWINS DISCORDANT IN SECOND TRIMESTER, FETUS 1 OF MULTIPLE GESTATION: ICD-10-CM

## 2017-11-17 DIAGNOSIS — O30.032 MONOCHORIONIC DIAMNIOTIC TWIN GESTATION IN SECOND TRIMESTER: Primary | ICD-10-CM

## 2017-11-17 PROCEDURE — 99999 PR PBB SHADOW E&M-EST. PATIENT-LVL III: CPT | Mod: PBBFAC,,, | Performed by: PEDIATRICS

## 2017-11-17 PROCEDURE — 76828 ECHO EXAM OF FETAL HEART: CPT | Mod: 59,PBBFAC | Performed by: PEDIATRICS

## 2017-11-17 PROCEDURE — 93325 DOPPLER ECHO COLOR FLOW MAPG: CPT | Mod: 26,59,S$PBB, | Performed by: PEDIATRICS

## 2017-11-17 PROCEDURE — 99213 OFFICE O/P EST LOW 20 MIN: CPT | Mod: PBBFAC | Performed by: PEDIATRICS

## 2017-11-17 PROCEDURE — 76826 ECHO EXAM OF FETAL HEART: CPT | Mod: 26,S$PBB,, | Performed by: PEDIATRICS

## 2017-11-17 PROCEDURE — 93325 DOPPLER ECHO COLOR FLOW MAPG: CPT | Mod: 59,PBBFAC | Performed by: PEDIATRICS

## 2017-11-17 PROCEDURE — 76826 ECHO EXAM OF FETAL HEART: CPT | Mod: 59,PBBFAC | Performed by: PEDIATRICS

## 2017-11-17 PROCEDURE — 99214 OFFICE O/P EST MOD 30 MIN: CPT | Mod: 25,S$PBB,, | Performed by: PEDIATRICS

## 2017-11-17 PROCEDURE — 76828 ECHO EXAM OF FETAL HEART: CPT | Mod: 26,S$PBB,, | Performed by: PEDIATRICS

## 2017-11-17 NOTE — LETTER
November 20, 2017        Kelsey Cheatham MD  [address]  [address] [facility] [address]             [facility] - Pediatric Cardiology  [address] [place] [address] [address] [facility] [address]  Phone: [phone]  Fax: [phone]   Patient: Jaimie Francis   MR Number: [id number]   YOB: 1995   Date of Visit: 11/17/2017       Dear Dr. Cheatham:    Thank you for referring Jaimie Francis to me for evaluation. Attached you will find relevant portions of my assessment and plan of care.    If you have questions, please do not hesitate to call me. I look forward to following Jaimie Farncis along with you.    Sincerely,      Law Carbajal MD            CC  No Recipients    Enclosure

## 2017-11-17 NOTE — PROGRESS NOTES
I had the pleasure of evaluating Jaimie rFancis (previously Chris) who is now 22 y.o.  and carrying her 3rd pregnancy at 29 4/7 weeks gestation. She is carrying monochorionic, diamniotic twins with discordant growth and was previously seen at weeks EGA with concern that Twin B was noted to have mild acceleration at the PDA and might have a VSD.  Twin A has been the smaller twin on past [facility] evaluations.      Current Outpatient Prescriptions:     prenatal vit#103-iron-FA () 27 mg iron- 1 mg Tab, Take 1 tablet by mouth once daily., Disp: 30 tablet, Rfl: 11  Review of patient's allergies indicates:  No Known Allergies    FETAL ECHOCARDIOGRAM (preliminary):    TWIN A  Twin A of monochorionic, diamniotic twins lying to the maternal right and slightly caudal in position.  Imaging was at least moderately compromised by available acoustic windows:  There is mild acceleration in the ductus arteriosus with peak velocity 1.7 m/sec.  Color doppler suggests but not diagnostic for small perimembranous VSD  Otherwise normal anatomical connections and function in the technically limited images that could be obtained    TWIN B  Twin B of monochorionic, diamniotic twins lying to the maternal left and slightly cranial in position.  Imaging was moderately compromised by available acoustic windows:  There is normal flow in the ductus arteriosus with peak velocity <1.4 m/sec.  The ventricular septum appears to be intact.  Otherwise normal anatomical connections and function in the technically limited images that could be obtained.  (Full report in electronic medical record)    I reviewed the strengths and weaknesses of fetal echocardiography. I also reviewed the current studies and the difficulties encountered with imaging of twins as well as the technical limitations encountered today.Twin A was particularly difficult to image.  Color Doppler images suggesting the possibility of a small VSD in the perimembranous region. There  was also mild acceleration in the ductus arteriosus to peak velocity <1.7 m/sec. that I did not think is likely to produce significant problems during the remainder of the gestation.  I reviewed the possibility of a VSD with the family and went over the possible implications of a small defect as well as the low probability of compromise as a  infant. I also reviewed the low probability of hemodynamically significant congenital heart disease in this infant despite the difficulty in demonstrating the anatomy clearly.  I do not think that further attempts at demonstrating the fetal anatomy are likely to be more successful and I have not suggested a repeat evaluation of this fetus unless Maternal Fetal Medicine has other questions in the future.    I also reviewed the results of the study on Twin B.  The quality of the images obtained were somewhat better in this fetus.  The previously noted acceleration in the ductus arteriosus was not present and there was no evidence to suggest a ventricular septal defect in this study.  I went over the high likelihood that this infant will be born with a normal heart.  I have not suggested another evaluation during gestation unless Maternal Fetal Medicine has questions going forward.    Finally I took the time to answer all the family's questions.  I have suggested that we evaluate the infant's as newborns if there are any questions about the clinical exam after delivery.  I will also be happy to see them at any time if questions arise later during gestation. Ms. Francis is comfortable with the plan.    RECOMMENDATIONS:  Evaluation of the infants after delivery if the clinical exam is abnormal        Note:  Over 50% of visit in consultation with the family >30 minutes

## 2017-11-20 NOTE — PROGRESS NOTES
"Indication  ========    mono/di twin gestation: TTTS surveillance (Dr. Oglesby).    History  ======    General History  Height 165 cm  Height (ft) 5 ft  Height (in) 5 in  Other: OB: DESIREE OGLESBY  Risk Factors  History risk factors: Multiple gestation  Details: mo-di twins    Pregnancy History  ==============    Maternal Lab Tests  Test: Sequential Screen part 2  Result: negative DSR <1:5000 (age DSR 1:1100)  Wants to know gender: yes    Maternal Assessment  =================    Height 165 cm  Height (ft) 5 ft  Height (in) 5 in    Method  ======    Transabdominal ultrasound examination, Voluson E10. View: Suboptimal view: limited by fetal position.    Pregnancy  =========    Twin pregnancy. Monochorionic-diamniotic. Number of fetuses: 2.    Dating  ======    Cycle: regular cycle  GA by "stated dating" 29 w + 5 d  PEDRO by "stated dating": 1/27/2018  Assigned: Dating performed on 10/12/2017, based on the external assessment  Assigned GA 29 w + 5 d  Assigned PEDRO: 1/27/2018    General Evaluation (1)  =================    Cardiac activity: present.  bpm.  Fetal movements: visualized.  Presentation: cephalic LLQ.  Umbilical cord: 3 vessel cord.  Amniotic fluid: MVP 4.1 cm.    Fetal Biometry (1)  ==============    Fetal Biometry  Calculated by: Hadlock (BPD-HC-AC-FL)  MVP 4.1 cm   bpm    Fetal Anatomy (1)  ==============    Stomach: normal  Bladder: normal  Wants to know gender: yes    General Evaluation (2)  =================    Cardiac activity: present.  bpm.  Fetal movements: visualized.  Presentation: cephalic RLQ, presenting.  Umbilical cord: 3 vessel cord.  Amniotic fluid: MVP 3.2 cm.    Fetal Biometry (2)  ==============    Fetal Biometry  Calculated by: Hadlock (BPD-HC-AC-FL)  MVP 3.2 cm   bpm    Fetal Anatomy (2)  ==============    Stomach: normal  Bladder: normal  Wants to know gender: yes    Impression  =========    1. 29w 5d monochorionic diamniotic twin gestation  2. No evidence " of TTTS:  stomach and bladder visualized for each fetus  3. Thin dividing membrane visualized with adequate and about equal amniotic fluid volume on  either side .    Recommendation  ==============    1. Recommend follow up sono in 2 wks with MFM for interval fetal growth, TTTS surveillance, and  amniotic fluid volume assessments  -TTTS surveillance every 2 wks and every 4th week growth/TTTS surveillance    2. Recommend fetal surveillance starting at 32 wks gestation (NST twice a week, on one of the  NST days perform weekly MVP)    3. Patient scheduled for Peds Cards f/u on 11-    4. Monochorionic/diamniotic twin gestation:  -Mono-di with normal growth, no TTTS:     37w0d - 37w 6d gestation    -Mono-di with IUGR of one or both:             35w0d - 35w 6d gestation    -Daniels-di - hx/ of TTTS, IUGR with              32w 0d - 35w 6d gestation  abnormal doppler, oligohydramnios,  or maternal co morbidities    .

## 2017-11-24 ENCOUNTER — TELEPHONE (OUTPATIENT)
Dept: MATERNAL FETAL MEDICINE | Facility: CLINIC | Age: 22
End: 2017-11-24

## 2017-11-24 NOTE — TELEPHONE ENCOUNTER
Message left for patient to call [facility] clinic at [phone] to make appointment for Monday      ----- Message from Rosie Vaughn MD sent at 11/24/2017  2:09 PM CST -----  Can we please let primary ob know that patient was a no show for visit?  Can we try to reschedule for Monday and also note in epic about possible fetal vsd?  Thanks.  rosie

## 2017-11-27 ENCOUNTER — OFFICE VISIT (OUTPATIENT)
Dept: MATERNAL FETAL MEDICINE | Facility: CLINIC | Age: 22
End: 2017-11-27
Payer: MEDICAID

## 2017-11-27 DIAGNOSIS — Z36.89 ENCOUNTER FOR ULTRASOUND TO CHECK FETAL GROWTH: Primary | ICD-10-CM

## 2017-11-27 DIAGNOSIS — O30.039 MONOCHORIONIC DIAMNIOTIC TWIN PREGNANCY, ANTEPARTUM: ICD-10-CM

## 2017-11-27 DIAGNOSIS — O36.5931 POOR FETAL GROWTH AFFECTING MANAGEMENT OF MOTHER IN THIRD TRIMESTER, FETUS 1 OF MULTIPLE GESTATION: ICD-10-CM

## 2017-11-27 DIAGNOSIS — O30.033 MONOCHORIONIC DIAMNIOTIC TWIN GESTATION IN THIRD TRIMESTER: ICD-10-CM

## 2017-11-27 PROBLEM — O35.BXX0 FETAL CARDIAC ANOMALY AFFECTING PREGNANCY, ANTEPARTUM: Status: ACTIVE | Noted: 2017-11-27

## 2017-11-27 PROCEDURE — 76819 FETAL BIOPHYS PROFIL W/O NST: CPT | Mod: 26,S$PBB,, | Performed by: OBSTETRICS & GYNECOLOGY

## 2017-11-27 PROCEDURE — 76820 UMBILICAL ARTERY ECHO: CPT | Mod: PBBFAC | Performed by: OBSTETRICS & GYNECOLOGY

## 2017-11-27 PROCEDURE — 76820 UMBILICAL ARTERY ECHO: CPT | Mod: 26,S$PBB,, | Performed by: OBSTETRICS & GYNECOLOGY

## 2017-11-27 PROCEDURE — 76816 OB US FOLLOW-UP PER FETUS: CPT | Mod: 26,S$PBB,, | Performed by: OBSTETRICS & GYNECOLOGY

## 2017-11-27 PROCEDURE — 99213 OFFICE O/P EST LOW 20 MIN: CPT | Mod: S$PBB,TH,25, | Performed by: OBSTETRICS & GYNECOLOGY

## 2017-11-27 PROCEDURE — 76819 FETAL BIOPHYS PROFIL W/O NST: CPT | Mod: 59,PBBFAC | Performed by: OBSTETRICS & GYNECOLOGY

## 2017-11-27 PROCEDURE — 76816 OB US FOLLOW-UP PER FETUS: CPT | Mod: PBBFAC | Performed by: OBSTETRICS & GYNECOLOGY

## 2017-11-27 NOTE — PROGRESS NOTES
"OB Ultrasound Report (see PDF version under imaging tab) and office visit    Indication  ========    Evaluation of fetal growth.    History  ======    General History  Height 165 cm  Height (ft) 5 ft  Height (in) 5 in  Other: OB: DESIREE SHOOK  Risk Factors  History risk factors: Multiple gestation  Details: mo-di twins    Pregnancy History  ===============    Maternal Lab Tests  Test: Sequential Screen part 2  Result: negative DSR <1:5000 (age DSR 1:1100)  Wants to know gender: yes    Maternal Assessment  ================    Height 165 cm  Height (ft) 5 ft  Height (in) 5 in    Method  ======    Transabdominal ultrasound examination, Voluson E10. View: Suboptimal view: limited by fetal position.    Pregnancy  =========    Twin pregnancy. Monochorionic-diamniotic. Number of fetuses: 2.    Dating  ======    Cycle: regular cycle  GA by "stated dating" 31 w + 2 d  PEDRO by "stated dating": 1/27/2018  Ultrasound examination on: 11/27/2017  GA by U/S based upon: AC, BPD, Femur, HC  GA by U/S 29 w + 2 d  PEDRO by U/S: 2/10/2018  GA by U/S based upon (Fetus 2): AC, BPD, Femur, HC  GA by U/S (Fetus 2) 30 w + 3 d  PEDRO by U/S (Fetus 2): 2/2/2018  Assigned: Dating performed on 10/12/2017, based on the external assessment  Assigned GA 31 w + 2 d  Assigned PEDRO: 1/27/2018    General Evaluation (1)  =================    Cardiac activity: present.  bpm.  Fetal movements: visualized.  Presentation: breech LUQ.  Placenta: posterior.  Umbilical cord: 3 vessel cord.  Amniotic fluid: MVP 2.9 cm. ESTRELLITA 0.4 cm. Q1 0.4 cm.    Biophysical Profile (1)  =================    2: Fetal breathing movements  2: Gross body movements  2: Fetal tone  2: Amniotic fluid volume  8/8: Biophysical profile score    Fetal Biometry (1)  ==============    Fetal Biometry  BPD 74.6 mm 29w 6d Hadlock  OFD 97.1 mm 31w 3d Jacki  .4 mm 29w 6d Hadlock  .4 mm 28w 0d Hadlock  Femur 55.8 mm 29w 3d Hadlock  EFW 1,285 g 8% Fernando  Calculated " by: Hadlock (BPD-HC-AC-FL)  EFW (lb) 2 lb  EFW (oz) 13 oz  EFW discordance 20.6 %  Cephalic index 0.77  HC / AC 1.15  FL / BPD 0.75  FL / AC 0.24  MVP 2.9 cm  ESTRELLITA 0.4 cm   bpm    Fetal Anatomy (1)  ==============    Cranium: normal  4-chamber view: normal  Stomach: normal  Kidneys: normal  Bladder: normal  Wants to know gender: yes    Fetal Doppler (1)  =============    Umbilical Cord  Umbilical A PS -41.54 cm/s  Umbilical A ED -8.95 cm/s  Umbilical A TAmax -24.07 cm/s  Umbilical A MD -8.46 cm/s  Umbilical A RI 0.79 98% Teddy  Umbilical A PI 1.38 99% Teddy  Umbilical A S / D 4.79 >99% Teddy  Umbilical A  bpm  Head / Brain  Rt MCA PI divided by: Umbilical artery PI  Lt MCA PI divided by: Umbilical artery PI    General Evaluation (2)  =================    Cardiac activity: present.  bpm.  Fetal movements: visualized.  Presentation: cephalic, presenting.  Placenta: posterior.  Umbilical cord: 3 vessel cord.  Amniotic fluid: MVP 4.3 cm. ESTRELLITA 0.4 cm. Q1 0.4 cm.    Biophysical Profile (2)  =================    2: Fetal breathing movements  2: Gross body movements  2: Fetal tone  2: Amniotic fluid volume  8/8: Biophysical profile score    Fetal Biometry (2)  ==============    Fetal Biometry  BPD 74.8 mm 30w 0d Hadlock  OFD 94.0 mm 30w 2d Jacki  .2 mm 29w 2d Hadlock  .6 mm 30w 3d Hadlock  Femur 60.8 mm 31w 4d Hadlock  EFW 1,618 g 23% Fernando  Calculated by: Hadlock (BPD-HC-AC-FL)  EFW (lb) 3 lb  EFW (oz) 9 oz  EFW discordance 20.6 %  Cephalic index 0.80  HC / AC 1.02  FL / BPD 0.81  FL / AC 0.23  MVP 4.3 cm  ESTRELLITA 0.4 cm   bpm    Fetal Anatomy (2)  ==============    Cranium: normal  4-chamber view: suboptimal  Stomach: normal  Kidneys: normal  Bladder: normal  Wants to know gender: yes    Ultrasound Impression and office visit note  =========    1. Monochorionic/diamniotic twins - 31 and 2/7 weeks  2. IUGR diagnosed in twin B today with the EFW at the 8th%  3. Borderline elevated  UA doppler PI's for twin B (based on assessment at the fetal end of the umbilical cord, the PI is at the 95th%)  4. Reassuring BPP scores for both twins  5. AGA growth pattern for twin B  6. No evidence of twin-twin transfusion syndrome (TTTS)    All findings and plans for follow-up evaluation discussed with the patient today (15 minutes spent in office visit, face-to-face time).    Recommendation  ==============    1. Begin twice weekly antepartum testing ---- for twin A - weekly BPP and UA doppler studies on Mondays and weekly NSTs on  Thursdays; for twin B --- weekly NSTs on Thursdays and BPP/AFV assessment on Mondays  2. Repeat growth assessment in 3 weeks  3. Continued surveillance for TTTS  4. If IUGR growth pattern persistent and testing is reassuring, our MFM group recommends delivery between 35 weeks 0 days and 35  weeks 6 days.

## 2017-12-01 ENCOUNTER — HOSPITAL ENCOUNTER (OUTPATIENT)
Dept: PERINATAL CARE | Facility: OTHER | Age: 22
Discharge: HOME | End: 2017-12-01
Attending: ADVANCED PRACTICE MIDWIFE
Payer: MEDICAID

## 2017-12-01 DIAGNOSIS — O30.039 MONOCHORIONIC DIAMNIOTIC TWIN PREGNANCY, ANTEPARTUM: ICD-10-CM

## 2017-12-01 PROCEDURE — 76818 FETAL BIOPHYS PROFILE W/NST: CPT

## 2017-12-01 PROCEDURE — 76818 FETAL BIOPHYS PROFILE W/NST: CPT | Mod: 26,,, | Performed by: OBSTETRICS & GYNECOLOGY

## 2017-12-01 NOTE — PROGRESS NOTES
Monochorionic-Diamniotic twins  Twin A: NST discontinuous. BPP performed-8/10. Reassuring fetal testing and normal AFV.  Twin B: NST discontinuous. BPP performed. 8/10. Reassuring fetal testing and normal AFV.

## 2017-12-05 ENCOUNTER — OFFICE VISIT (OUTPATIENT)
Dept: MATERNAL FETAL MEDICINE | Facility: CLINIC | Age: 22
End: 2017-12-05
Attending: OBSTETRICS & GYNECOLOGY
Payer: MEDICAID

## 2017-12-05 VITALS
SYSTOLIC BLOOD PRESSURE: 118 MMHG | BODY MASS INDEX: 41.42 KG/M2 | WEIGHT: 248.88 LBS | DIASTOLIC BLOOD PRESSURE: 76 MMHG

## 2017-12-05 DIAGNOSIS — O30.039 MONOCHORIONIC DIAMNIOTIC TWIN PREGNANCY, ANTEPARTUM: ICD-10-CM

## 2017-12-05 DIAGNOSIS — O36.5931 POOR FETAL GROWTH AFFECTING MANAGEMENT OF MOTHER IN THIRD TRIMESTER, FETUS 1 OF MULTIPLE GESTATION: ICD-10-CM

## 2017-12-05 PROCEDURE — 76819 FETAL BIOPHYS PROFIL W/O NST: CPT | Mod: 26,S$PBB,, | Performed by: OBSTETRICS & GYNECOLOGY

## 2017-12-05 PROCEDURE — 99999 PR PBB SHADOW E&M-EST. PATIENT-LVL II: CPT | Mod: PBBFAC,,,

## 2017-12-05 PROCEDURE — 76820 UMBILICAL ARTERY ECHO: CPT | Mod: PBBFAC | Performed by: OBSTETRICS & GYNECOLOGY

## 2017-12-05 PROCEDURE — 99212 OFFICE O/P EST SF 10 MIN: CPT | Mod: PBBFAC

## 2017-12-05 PROCEDURE — 76820 UMBILICAL ARTERY ECHO: CPT | Mod: 26,S$PBB,, | Performed by: OBSTETRICS & GYNECOLOGY

## 2017-12-05 PROCEDURE — 99499 UNLISTED E&M SERVICE: CPT | Mod: S$PBB,,, | Performed by: OBSTETRICS & GYNECOLOGY

## 2017-12-05 PROCEDURE — 76819 FETAL BIOPHYS PROFIL W/O NST: CPT | Mod: PBBFAC | Performed by: OBSTETRICS & GYNECOLOGY

## 2017-12-05 NOTE — PROGRESS NOTES
"OB Ultrasound Report (see PDF version under imaging tab)    Indication  ========    Twins BPP, Doppler Twin A.    History  ======    General History  Height 165 cm  Height (ft) 5 ft  Height (in) 5 in  Other: OB: DESIREE SHOOK  Risk Factors  History risk factors: Multiple gestation  Details: mo-di twins    Pregnancy History  ===============    Maternal Lab Tests  Test: Sequential Screen part 2  Result: negative DSR <1:5000 (age DSR 1:1100)  Wants to know gender: yes    Maternal Assessment  ================    Height 165 cm  Height (ft) 5 ft  Height (in) 5 in    Pregnancy  =========    Twin pregnancy. Monochorionic-diamniotic. Number of fetuses: 2.    Dating  ======    Cycle: regular cycle  GA by "stated dating" 32 w + 3 d  PEDRO by "stated dating": 1/27/2018  Assigned: Dating performed on 10/12/2017, based on the external assessment  Assigned GA 32 w + 3 d  Assigned PEDRO: 1/27/2018    General Evaluation (1)  =================    Cardiac activity: present.  bpm.  Fetal movements: visualized.  Presentation: breech,LLQ.  Umbilical cord: 3 vessel cord.  Amniotic fluid: Amount of AF: normal amount. MVP 6.4 cm.    Biophysical Profile (1)  =================    2: Fetal breathing movements  2: Gross body movements  2: Fetal tone  2: Amniotic fluid volume  8/8: Biophysical profile score    Fetal Biometry (1)  ==============    Fetal Biometry  Calculated by: Hadlock (BPD-HC-AC-FL)  MVP 6.4 cm   bpm    Fetal Anatomy (1)  ==============    Stomach: normal  Bladder: normal  Wants to know gender: yes    Fetal Doppler (1)  =============    Umbilical Cord  Umbilical A PI 1.24 95% Teddy  Umbilical A S / D 4.00 97% Teddy  Umbilical A  bpm  Head / Brain  Rt MCA PI divided by: Umbilical artery PI  Lt MCA PI divided by: Umbilical artery PI    General Evaluation (2)  =================    Cardiac activity: present.  bpm.  Fetal movements: visualized.  Presentation: cephalic, RUQ.  Umbilical cord: 3 vessel " cord.  Amniotic fluid: Amount of AF: normal amount. MVP 4.5 cm.    Biophysical Profile (2)  =================    2: Fetal breathing movements  2: Gross body movements  2: Fetal tone  2: Amniotic fluid volume  8/8: Biophysical profile score    Fetal Biometry (2)  ==============    Fetal Biometry  Calculated by: Hadlock (BPD-HC-AC-FL)  MVP 4.5 cm   bpm    Fetal Anatomy (2)  ==============    Stomach: normal  Bladder: normal  Wants to know gender: yes    Impression  =========    The BPP scores are reassuring for both twins at 8/8. UA doppler studies show a stable pattern with the pulsatility index at  approximately the 90th% for sampling at the fetal end of the cord for twin A.  The MVP's are normal for both twins.    Recommendation  ==============    Continue fetal surveillance as previously outlined.

## 2017-12-05 NOTE — LETTER
December 5, 2017      Kelsey Oglesby MD  [address]  [address]  [place] [address]           [facility]  [address]  [facility] [address]  Phone: [phone]          Patient: Jaimie Francis   MR Number: [id number]   YOB: 1995   Date of Visit: 12/5/2017       Dear Dr. Kelsey Oglesby:    Thank you for referring Jaimie Francis to me for evaluation. Attached you will find relevant portions of my assessment and plan of care.    If you have questions, please do not hesitate to call me. I look forward to following Jaimie Francis along with you.    Sincerely,    Jennifer Sanchez MD    Enclosure  CC:  No Recipients    If you would like to receive this communication electronically, please contact [email or web address] or [phone] to request more information on [employer] Link access.    For providers and/or their staff who would like to refer a patient to [facility], please contact us through our one-stop-shop provider referral line, [facility], at [phone].    If you feel you have received this communication in error or would no longer like to receive these types of communications, please e-mail [email or web address]

## 2017-12-08 ENCOUNTER — HOSPITAL ENCOUNTER (OUTPATIENT)
Dept: PERINATAL CARE | Facility: OTHER | Age: 22
Discharge: HOME | End: 2017-12-08
Attending: ADVANCED PRACTICE MIDWIFE
Payer: MEDICAID

## 2017-12-08 DIAGNOSIS — O30.039 MONOCHORIONIC DIAMNIOTIC TWIN PREGNANCY, ANTEPARTUM: ICD-10-CM

## 2017-12-08 PROCEDURE — 76818 FETAL BIOPHYS PROFILE W/NST: CPT

## 2017-12-08 PROCEDURE — 76818 FETAL BIOPHYS PROFILE W/NST: CPT | Mod: 26,59,S$PBB, | Performed by: OBSTETRICS & GYNECOLOGY

## 2017-12-12 ENCOUNTER — OFFICE VISIT (OUTPATIENT)
Dept: MATERNAL FETAL MEDICINE | Facility: CLINIC | Age: 22
End: 2017-12-12
Attending: OBSTETRICS & GYNECOLOGY
Payer: MEDICAID

## 2017-12-12 VITALS
WEIGHT: 249.81 LBS | SYSTOLIC BLOOD PRESSURE: 130 MMHG | BODY MASS INDEX: 41.57 KG/M2 | DIASTOLIC BLOOD PRESSURE: 76 MMHG

## 2017-12-12 DIAGNOSIS — O30.039 MONOCHORIONIC DIAMNIOTIC TWIN PREGNANCY, ANTEPARTUM: ICD-10-CM

## 2017-12-12 DIAGNOSIS — Z91.89 AT RISK FOR INFECTIOUS DISEASE DUE TO RECENT FOREIGN TRAVEL: ICD-10-CM

## 2017-12-12 PROCEDURE — 99999 PR PBB SHADOW E&M-EST. PATIENT-LVL II: CPT | Mod: PBBFAC,,,

## 2017-12-12 PROCEDURE — 76819 FETAL BIOPHYS PROFIL W/O NST: CPT | Mod: 26,59,S$PBB, | Performed by: OBSTETRICS & GYNECOLOGY

## 2017-12-12 PROCEDURE — 76820 UMBILICAL ARTERY ECHO: CPT | Mod: 26,S$PBB,, | Performed by: OBSTETRICS & GYNECOLOGY

## 2017-12-12 PROCEDURE — 99212 OFFICE O/P EST SF 10 MIN: CPT | Mod: PBBFAC

## 2017-12-12 PROCEDURE — 76820 UMBILICAL ARTERY ECHO: CPT | Mod: PBBFAC | Performed by: OBSTETRICS & GYNECOLOGY

## 2017-12-12 PROCEDURE — 99499 UNLISTED E&M SERVICE: CPT | Mod: S$PBB,,, | Performed by: OBSTETRICS & GYNECOLOGY

## 2017-12-12 PROCEDURE — 76819 FETAL BIOPHYS PROFIL W/O NST: CPT | Mod: PBBFAC | Performed by: OBSTETRICS & GYNECOLOGY

## 2017-12-12 NOTE — LETTER
December 12, 2017      Kelsey Oglesby MD  [address]  [address]  [place] [address]           [facility]  [address]  [facility] [address]  Phone: [phone]          Patient: Jaimie Francis   MR Number: [id number]   YOB: 1995   Date of Visit: 12/12/2017       Dear Dr. Kelsey Oglesby:    Thank you for referring Jaimie Francis to me for evaluation. Attached you will find relevant portions of my assessment and plan of care.    If you have questions, please do not hesitate to call me. I look forward to following Jaimie Francis along with you.    Sincerely,    Jennifer Sanchez MD    Enclosure  CC:  No Recipients    If you would like to receive this communication electronically, please contact [email or web address] or [phone] to request more information on [employer] Link access.    For providers and/or their staff who would like to refer a patient to [facility], please contact us through our one-stop-shop provider referral line, [facility] [place], at [phone].    If you feel you have received this communication in error or would no longer like to receive these types of communications, please e-mail [email or web address]

## 2017-12-12 NOTE — PROGRESS NOTES
"OB Ultrasound Report (see PDF version under imaging tab)    Indication  ========    IUGR in twin A - Twins BPP; doppler twin A.    History  ======    General History  Height 165 cm  Height (ft) 5 ft  Height (in) 5 in  Other: OB: DESIREE SHOOK  Risk Factors  History risk factors: Multiple gestation  Details: mo-di twins    Maternal Assessment  ================    Height 165 cm  Height (ft) 5 ft  Height (in) 5 in    Method  ======    Transabdominal ultrasound examination, Voluson E10. View: Suboptimal view: limited by fetal position.    Pregnancy  =========    Twin pregnancy. Monochorionic-diamniotic. Number of fetuses: 2.    Dating  ======    Cycle: regular cycle  GA by "stated dating" 33 w + 3 d  PEDRO by "stated dating": 1/27/2018  Assigned: Dating performed on 10/12/2017, based on the external assessment  Assigned GA 33 w + 3 d  Assigned PEDRO: 1/27/2018    General Evaluation (1)  =================    Cardiac activity: present.  bpm.  Fetal movements: visualized.  Presentation: breech, left.  Placenta: posterior, fundal.  Umbilical cord: 3 vessel cord.    Amniotic Fluid Assessment (1)  =======================    Amount of AF: normal amount  MVP 2.7 cm    Biophysical Profile (1)  =================    2: Fetal breathing movements  2: Gross body movements  2: Fetal tone  2: Amniotic fluid volume  8/8: Biophysical profile score  Interpretation: normal    Fetal Doppler (1)  =============    Umbilical Cord  Umbilical A S / D 2.80 63% Teddy  Umbilical A  bpm    General Evaluation (2)  =================    Cardiac activity: present.  bpm.  Fetal movements: visualized.  Presentation: cephalic, right.  Placenta: posterior, fundal.  Umbilical cord: 3 vessel cord.    Amniotic Fluid Assessment (2)  =======================    Amount of AF: normal amount  MVP 3.8 cm    Biophysical Profile (2)  =================    2: Fetal breathing movements  2: Gross body movements  2: Fetal tone  2: Amniotic fluid " volume  8/8: Biophysical profile score  Interpretation: normal    Impression  =========    The BPP scores are reassuring at 8/8 for both twins. The MVPs are normal for both twins. UA doppler S/D ratios are normal for twin  A. There is no evidence of twin-twin transfusion syndrome.  Findings and recommendations for f/u reviewed with the patient today.    Recommendation  ==============    Continue twice weekly fetal antepartum surveillance as previously outlined.

## 2017-12-15 ENCOUNTER — HOSPITAL ENCOUNTER (OUTPATIENT)
Dept: PERINATAL CARE | Facility: OTHER | Age: 22
Discharge: HOME | End: 2017-12-15
Attending: ADVANCED PRACTICE MIDWIFE
Payer: MEDICAID

## 2017-12-15 DIAGNOSIS — O30.039 MONOCHORIONIC DIAMNIOTIC TWIN PREGNANCY, ANTEPARTUM: ICD-10-CM

## 2017-12-15 PROCEDURE — 59025 FETAL NON-STRESS TEST: CPT | Mod: 26,,, | Performed by: OBSTETRICS & GYNECOLOGY

## 2017-12-15 PROCEDURE — 59025 FETAL NON-STRESS TEST: CPT

## 2017-12-19 ENCOUNTER — ANESTHESIA (OUTPATIENT)
Dept: OBSTETRICS AND GYNECOLOGY | Facility: HOSPITAL | Age: 22
End: 2017-12-19
Payer: MEDICAID

## 2017-12-19 ENCOUNTER — ANESTHESIA EVENT (OUTPATIENT)
Dept: OBSTETRICS AND GYNECOLOGY | Facility: HOSPITAL | Age: 22
End: 2017-12-19
Payer: MEDICAID

## 2017-12-19 ENCOUNTER — SURGERY (OUTPATIENT)
Age: 22
End: 2017-12-19

## 2017-12-19 ENCOUNTER — HOSPITAL ENCOUNTER (INPATIENT)
Facility: HOSPITAL | Age: 22
LOS: 4 days | Discharge: HOME | End: 2017-12-23
Attending: OBSTETRICS & GYNECOLOGY | Admitting: OBSTETRICS & GYNECOLOGY
Payer: MEDICAID

## 2017-12-19 ENCOUNTER — OFFICE VISIT (OUTPATIENT)
Dept: MATERNAL FETAL MEDICINE | Facility: CLINIC | Age: 22
End: 2017-12-19
Attending: OBSTETRICS & GYNECOLOGY
Payer: MEDICAID

## 2017-12-19 VITALS
DIASTOLIC BLOOD PRESSURE: 80 MMHG | WEIGHT: 251.56 LBS | SYSTOLIC BLOOD PRESSURE: 126 MMHG | BODY MASS INDEX: 41.86 KG/M2

## 2017-12-19 DIAGNOSIS — O30.002 TWIN PREGNANCY, TWINS DISCORDANT IN SECOND TRIMESTER, FETUS 1 OF MULTIPLE GESTATION: Primary | ICD-10-CM

## 2017-12-19 DIAGNOSIS — Z34.90 PREGNANCY WITH ONE FETUS: ICD-10-CM

## 2017-12-19 DIAGNOSIS — O30.033 MONOCHORIONIC DIAMNIOTIC TWIN GESTATION IN THIRD TRIMESTER: ICD-10-CM

## 2017-12-19 DIAGNOSIS — O36.5921 TWIN PREGNANCY, TWINS DISCORDANT IN SECOND TRIMESTER, FETUS 1 OF MULTIPLE GESTATION: Primary | ICD-10-CM

## 2017-12-19 DIAGNOSIS — O36.5931 POOR FETAL GROWTH AFFECTING MANAGEMENT OF MOTHER IN THIRD TRIMESTER, FETUS 1 OF MULTIPLE GESTATION: ICD-10-CM

## 2017-12-19 PROBLEM — Z30.09 FAMILY PLANNING: Status: RESOLVED | Noted: 2017-10-13 | Resolved: 2017-12-19

## 2017-12-19 LAB
ABO + RH BLD: NORMAL
BASOPHILS # BLD AUTO: 0.01 K/UL
BASOPHILS NFR BLD: 0.2 %
BLD GP AB SCN CELLS X3 SERPL QL: NORMAL
BLOOD GROUP ANTIBODIES SERPL: NORMAL
DIFFERENTIAL METHOD: ABNORMAL
EOSINOPHIL # BLD AUTO: 0.1 K/UL
EOSINOPHIL NFR BLD: 1 %
ERYTHROCYTE [DISTWIDTH] IN BLOOD BY AUTOMATED COUNT: 13.6 %
HCT VFR BLD AUTO: 31.5 %
HGB BLD-MCNC: 9.9 G/DL
LYMPHOCYTES # BLD AUTO: 1.9 K/UL
LYMPHOCYTES NFR BLD: 33.7 %
MCH RBC QN AUTO: 25.4 PG
MCHC RBC AUTO-ENTMCNC: 31.4 G/DL
MCV RBC AUTO: 81 FL
MONOCYTES # BLD AUTO: 0.5 K/UL
MONOCYTES NFR BLD: 8.5 %
NEUTROPHILS # BLD AUTO: 3.3 K/UL
NEUTROPHILS NFR BLD: 56.6 %
PLATELET # BLD AUTO: 259 K/UL
PMV BLD AUTO: 9.7 FL
RBC # BLD AUTO: 3.89 M/UL
WBC # BLD AUTO: 5.75 K/UL

## 2017-12-19 PROCEDURE — 86850 RBC ANTIBODY SCREEN: CPT

## 2017-12-19 PROCEDURE — 76819 FETAL BIOPHYS PROFIL W/O NST: CPT | Mod: 26,S$PBB,, | Performed by: OBSTETRICS & GYNECOLOGY

## 2017-12-19 PROCEDURE — 25000003 PHARM REV CODE 250: Performed by: OBSTETRICS & GYNECOLOGY

## 2017-12-19 PROCEDURE — 86900 BLOOD TYPING SEROLOGIC ABO: CPT

## 2017-12-19 PROCEDURE — 36000686 HC CESAREAN SECTION LEVEL II

## 2017-12-19 PROCEDURE — 99213 OFFICE O/P EST LOW 20 MIN: CPT | Mod: S$PBB,TH,25, | Performed by: OBSTETRICS & GYNECOLOGY

## 2017-12-19 PROCEDURE — 99999 PR PBB SHADOW E&M-EST. PATIENT-LVL I: CPT | Mod: PBBFAC,,,

## 2017-12-19 PROCEDURE — S0028 INJECTION, FAMOTIDINE, 20 MG: HCPCS | Performed by: ANESTHESIOLOGY

## 2017-12-19 PROCEDURE — 63600175 PHARM REV CODE 636 W HCPCS: Performed by: NURSE ANESTHETIST, CERTIFIED REGISTERED

## 2017-12-19 PROCEDURE — 99211 OFF/OP EST MAY X REQ PHY/QHP: CPT | Mod: PBBFAC

## 2017-12-19 PROCEDURE — 88307 TISSUE EXAM BY PATHOLOGIST: CPT | Mod: 26,,, | Performed by: PATHOLOGY

## 2017-12-19 PROCEDURE — 86592 SYPHILIS TEST NON-TREP QUAL: CPT

## 2017-12-19 PROCEDURE — 63600175 PHARM REV CODE 636 W HCPCS: Performed by: OBSTETRICS & GYNECOLOGY

## 2017-12-19 PROCEDURE — 25000003 PHARM REV CODE 250: Performed by: ANESTHESIOLOGY

## 2017-12-19 PROCEDURE — 76820 UMBILICAL ARTERY ECHO: CPT | Mod: 26,S$PBB,, | Performed by: OBSTETRICS & GYNECOLOGY

## 2017-12-19 PROCEDURE — 25000003 PHARM REV CODE 250: Performed by: NURSE ANESTHETIST, CERTIFIED REGISTERED

## 2017-12-19 PROCEDURE — 51702 INSERT TEMP BLADDER CATH: CPT

## 2017-12-19 PROCEDURE — 36415 COLL VENOUS BLD VENIPUNCTURE: CPT

## 2017-12-19 PROCEDURE — 59514 CESAREAN DELIVERY ONLY: CPT | Mod: ANES,,, | Performed by: ANESTHESIOLOGY

## 2017-12-19 PROCEDURE — 11000001 HC ACUTE MED/SURG PRIVATE ROOM

## 2017-12-19 PROCEDURE — 86870 RBC ANTIBODY IDENTIFICATION: CPT

## 2017-12-19 PROCEDURE — 85025 COMPLETE CBC W/AUTO DIFF WBC: CPT

## 2017-12-19 PROCEDURE — 63600175 PHARM REV CODE 636 W HCPCS: Performed by: ANESTHESIOLOGY

## 2017-12-19 PROCEDURE — 37000008 HC ANESTHESIA 1ST 15 MINUTES: Performed by: OBSTETRICS & GYNECOLOGY

## 2017-12-19 PROCEDURE — 99211 OFF/OP EST MAY X REQ PHY/QHP: CPT | Mod: 27

## 2017-12-19 PROCEDURE — 37000009 HC ANESTHESIA EA ADD 15 MINS: Performed by: OBSTETRICS & GYNECOLOGY

## 2017-12-19 PROCEDURE — 76819 FETAL BIOPHYS PROFIL W/O NST: CPT | Mod: 59,PBBFAC | Performed by: OBSTETRICS & GYNECOLOGY

## 2017-12-19 PROCEDURE — 76816 OB US FOLLOW-UP PER FETUS: CPT | Mod: 26,59,S$PBB, | Performed by: OBSTETRICS & GYNECOLOGY

## 2017-12-19 PROCEDURE — 76816 OB US FOLLOW-UP PER FETUS: CPT | Mod: PBBFAC | Performed by: OBSTETRICS & GYNECOLOGY

## 2017-12-19 PROCEDURE — 88307 TISSUE EXAM BY PATHOLOGIST: CPT | Performed by: PATHOLOGY

## 2017-12-19 PROCEDURE — 76820 UMBILICAL ARTERY ECHO: CPT | Mod: PBBFAC | Performed by: OBSTETRICS & GYNECOLOGY

## 2017-12-19 PROCEDURE — 59514 CESAREAN DELIVERY ONLY: CPT | Mod: CRNA,,, | Performed by: NURSE ANESTHETIST, CERTIFIED REGISTERED

## 2017-12-19 RX ORDER — ONDANSETRON 8 MG/1
8 TABLET, ORALLY DISINTEGRATING ORAL EVERY 8 HOURS PRN
Status: DISCONTINUED | OUTPATIENT
Start: 2017-12-19 | End: 2017-12-23 | Stop reason: HOSPADM

## 2017-12-19 RX ORDER — SODIUM CHLORIDE, SODIUM LACTATE, POTASSIUM CHLORIDE, CALCIUM CHLORIDE 600; 310; 30; 20 MG/100ML; MG/100ML; MG/100ML; MG/100ML
INJECTION, SOLUTION INTRAVENOUS CONTINUOUS
Status: DISCONTINUED | OUTPATIENT
Start: 2017-12-19 | End: 2017-12-19

## 2017-12-19 RX ORDER — BISACODYL 10 MG
10 SUPPOSITORY, RECTAL RECTAL ONCE AS NEEDED
Status: ACTIVE | OUTPATIENT
Start: 2017-12-19 | End: 2017-12-19

## 2017-12-19 RX ORDER — NALOXONE HCL 0.4 MG/ML
0.02 VIAL (ML) INJECTION
Status: DISCONTINUED | OUTPATIENT
Start: 2017-12-19 | End: 2017-12-23 | Stop reason: HOSPADM

## 2017-12-19 RX ORDER — HYDROCORTISONE 25 MG/G
CREAM TOPICAL 3 TIMES DAILY PRN
Status: DISCONTINUED | OUTPATIENT
Start: 2017-12-19 | End: 2017-12-23 | Stop reason: HOSPADM

## 2017-12-19 RX ORDER — HYDROMORPHONE HCL IN 0.9% NACL 6 MG/30 ML
PATIENT CONTROLLED ANALGESIA SYRINGE INTRAVENOUS CONTINUOUS
Status: DISCONTINUED | OUTPATIENT
Start: 2017-12-19 | End: 2017-12-23 | Stop reason: HOSPADM

## 2017-12-19 RX ORDER — PHENYLEPHRINE HYDROCHLORIDE 10 MG/ML
INJECTION INTRAVENOUS
Status: DISCONTINUED | OUTPATIENT
Start: 2017-12-19 | End: 2017-12-19

## 2017-12-19 RX ORDER — DIPHENHYDRAMINE HCL 25 MG
25 CAPSULE ORAL EVERY 4 HOURS PRN
Status: DISCONTINUED | OUTPATIENT
Start: 2017-12-19 | End: 2017-12-23 | Stop reason: HOSPADM

## 2017-12-19 RX ORDER — ONDANSETRON HYDROCHLORIDE 2 MG/ML
INJECTION, SOLUTION INTRAMUSCULAR; INTRAVENOUS
Status: DISCONTINUED | OUTPATIENT
Start: 2017-12-19 | End: 2017-12-19

## 2017-12-19 RX ORDER — FAMOTIDINE 10 MG/ML
20 INJECTION INTRAVENOUS ONCE
Status: COMPLETED | OUTPATIENT
Start: 2017-12-19 | End: 2017-12-19

## 2017-12-19 RX ORDER — OXYTOCIN/RINGER'S LACTATE 20/1000 ML
41.65 PLASTIC BAG, INJECTION (ML) INTRAVENOUS CONTINUOUS
Status: DISPENSED | OUTPATIENT
Start: 2017-12-19 | End: 2017-12-19

## 2017-12-19 RX ORDER — IBUPROFEN 600 MG/1
600 TABLET ORAL EVERY 6 HOURS
Status: DISCONTINUED | OUTPATIENT
Start: 2017-12-20 | End: 2017-12-20

## 2017-12-19 RX ORDER — SIMETHICONE 80 MG
1 TABLET,CHEWABLE ORAL EVERY 6 HOURS PRN
Status: DISCONTINUED | OUTPATIENT
Start: 2017-12-19 | End: 2017-12-23 | Stop reason: HOSPADM

## 2017-12-19 RX ORDER — OXYCODONE AND ACETAMINOPHEN 5; 325 MG/1; MG/1
1 TABLET ORAL EVERY 4 HOURS PRN
Status: DISCONTINUED | OUTPATIENT
Start: 2017-12-19 | End: 2017-12-23 | Stop reason: HOSPADM

## 2017-12-19 RX ORDER — AMOXICILLIN 250 MG
1 CAPSULE ORAL NIGHTLY PRN
Status: DISCONTINUED | OUTPATIENT
Start: 2017-12-19 | End: 2017-12-23 | Stop reason: HOSPADM

## 2017-12-19 RX ORDER — CEFAZOLIN SODIUM 2 G/50ML
2 SOLUTION INTRAVENOUS ONCE
Status: COMPLETED | OUTPATIENT
Start: 2017-12-19 | End: 2017-12-19

## 2017-12-19 RX ORDER — METOCLOPRAMIDE HYDROCHLORIDE 5 MG/ML
INJECTION INTRAMUSCULAR; INTRAVENOUS
Status: DISCONTINUED | OUTPATIENT
Start: 2017-12-19 | End: 2017-12-19

## 2017-12-19 RX ORDER — BUPIVACAINE HYDROCHLORIDE 7.5 MG/ML
INJECTION, SOLUTION INTRASPINAL
Status: DISCONTINUED | OUTPATIENT
Start: 2017-12-19 | End: 2017-12-19

## 2017-12-19 RX ORDER — FENTANYL CITRATE 50 UG/ML
INJECTION, SOLUTION INTRAMUSCULAR; INTRAVENOUS
Status: DISCONTINUED | OUTPATIENT
Start: 2017-12-19 | End: 2017-12-19

## 2017-12-19 RX ORDER — SODIUM CHLORIDE, SODIUM LACTATE, POTASSIUM CHLORIDE, CALCIUM CHLORIDE 600; 310; 30; 20 MG/100ML; MG/100ML; MG/100ML; MG/100ML
INJECTION, SOLUTION INTRAVENOUS CONTINUOUS
Status: ACTIVE | OUTPATIENT
Start: 2017-12-19 | End: 2017-12-20

## 2017-12-19 RX ORDER — ADHESIVE BANDAGE
30 BANDAGE TOPICAL 2 TIMES DAILY PRN
Status: DISCONTINUED | OUTPATIENT
Start: 2017-12-20 | End: 2017-12-23 | Stop reason: HOSPADM

## 2017-12-19 RX ORDER — OXYTOCIN 10 [USP'U]/ML
INJECTION, SOLUTION INTRAMUSCULAR; INTRAVENOUS
Status: DISCONTINUED | OUTPATIENT
Start: 2017-12-19 | End: 2017-12-19

## 2017-12-19 RX ORDER — MUPIROCIN 20 MG/G
1 OINTMENT TOPICAL 2 TIMES DAILY
Status: DISCONTINUED | OUTPATIENT
Start: 2017-12-19 | End: 2017-12-23 | Stop reason: HOSPADM

## 2017-12-19 RX ORDER — KETOROLAC TROMETHAMINE 30 MG/ML
30 INJECTION, SOLUTION INTRAMUSCULAR; INTRAVENOUS EVERY 6 HOURS
Status: DISCONTINUED | OUTPATIENT
Start: 2017-12-19 | End: 2017-12-20

## 2017-12-19 RX ORDER — ACETAMINOPHEN 10 MG/ML
1000 INJECTION, SOLUTION INTRAVENOUS EVERY 8 HOURS
Status: DISPENSED | OUTPATIENT
Start: 2017-12-19 | End: 2017-12-20

## 2017-12-19 RX ORDER — OXYCODONE AND ACETAMINOPHEN 10; 325 MG/1; MG/1
1 TABLET ORAL EVERY 4 HOURS PRN
Status: DISCONTINUED | OUTPATIENT
Start: 2017-12-19 | End: 2017-12-23 | Stop reason: HOSPADM

## 2017-12-19 RX ORDER — SODIUM CITRATE AND CITRIC ACID MONOHYDRATE 334; 500 MG/5ML; MG/5ML
30 SOLUTION ORAL ONCE
Status: COMPLETED | OUTPATIENT
Start: 2017-12-19 | End: 2017-12-19

## 2017-12-19 RX ORDER — DOCUSATE SODIUM 100 MG/1
200 CAPSULE, LIQUID FILLED ORAL 2 TIMES DAILY
Status: DISCONTINUED | OUTPATIENT
Start: 2017-12-19 | End: 2017-12-23 | Stop reason: HOSPADM

## 2017-12-19 RX ADMIN — SODIUM CHLORIDE, SODIUM LACTATE, POTASSIUM CHLORIDE, AND CALCIUM CHLORIDE: 600; 310; 30; 20 INJECTION, SOLUTION INTRAVENOUS at 12:12

## 2017-12-19 RX ADMIN — MUPIROCIN 1 G: 20 OINTMENT TOPICAL at 09:12

## 2017-12-19 RX ADMIN — Medication 41.65 MILLI-UNITS/MIN: at 03:12

## 2017-12-19 RX ADMIN — FENTANYL CITRATE 90 MCG: 50 INJECTION, SOLUTION INTRAMUSCULAR; INTRAVENOUS at 02:12

## 2017-12-19 RX ADMIN — ACETAMINOPHEN 1000 MG: 10 INJECTION, SOLUTION INTRAVENOUS at 10:12

## 2017-12-19 RX ADMIN — ONDANSETRON 4 MG: 2 INJECTION, SOLUTION INTRAMUSCULAR; INTRAVENOUS at 02:12

## 2017-12-19 RX ADMIN — PHENYLEPHRINE HYDROCHLORIDE 200 MCG: 10 INJECTION INTRAVENOUS at 02:12

## 2017-12-19 RX ADMIN — KETOROLAC TROMETHAMINE 30 MG: 30 INJECTION, SOLUTION INTRAMUSCULAR at 06:12

## 2017-12-19 RX ADMIN — CEFAZOLIN SODIUM 2 G: 2 SOLUTION INTRAVENOUS at 01:12

## 2017-12-19 RX ADMIN — SODIUM CITRATE AND CITRIC ACID MONOHYDRATE 30 ML: 500; 334 SOLUTION ORAL at 01:12

## 2017-12-19 RX ADMIN — OXYTOCIN 20 UNITS: 10 INJECTION, SOLUTION INTRAMUSCULAR; INTRAVENOUS at 03:12

## 2017-12-19 RX ADMIN — FAMOTIDINE 20 MG: 10 INJECTION, SOLUTION INTRAVENOUS at 01:12

## 2017-12-19 RX ADMIN — FENTANYL CITRATE 10 MCG: 50 INJECTION, SOLUTION INTRAMUSCULAR; INTRAVENOUS at 02:12

## 2017-12-19 RX ADMIN — BUPIVACAINE HYDROCHLORIDE IN DEXTROSE 1.6 ML: 7.5 INJECTION, SOLUTION SUBARACHNOID at 02:12

## 2017-12-19 RX ADMIN — METOCLOPRAMIDE 10 MG: 5 INJECTION, SOLUTION INTRAMUSCULAR; INTRAVENOUS at 02:12

## 2017-12-19 RX ADMIN — SODIUM CHLORIDE, SODIUM LACTATE, POTASSIUM CHLORIDE, AND CALCIUM CHLORIDE: 600; 310; 30; 20 INJECTION, SOLUTION INTRAVENOUS at 03:12

## 2017-12-19 RX ADMIN — SODIUM CHLORIDE, SODIUM LACTATE, POTASSIUM CHLORIDE, AND CALCIUM CHLORIDE: 600; 310; 30; 20 INJECTION, SOLUTION INTRAVENOUS at 02:12

## 2017-12-19 RX ADMIN — ONDANSETRON 8 MG: 8 TABLET, ORALLY DISINTEGRATING ORAL at 08:12

## 2017-12-19 RX ADMIN — OXYTOCIN 20 UNITS: 10 INJECTION, SOLUTION INTRAMUSCULAR; INTRAVENOUS at 02:12

## 2017-12-19 RX ADMIN — PHENYLEPHRINE HYDROCHLORIDE 400 MCG: 10 INJECTION INTRAVENOUS at 02:12

## 2017-12-19 RX ADMIN — SODIUM CHLORIDE, SODIUM LACTATE, POTASSIUM CHLORIDE, AND CALCIUM CHLORIDE: 600; 310; 30; 20 INJECTION, SOLUTION INTRAVENOUS at 01:12

## 2017-12-19 RX ADMIN — Medication: at 03:12

## 2017-12-19 NOTE — LACTATION NOTE
"Pt plans to breastfeed babies.  Reviewed the benefits of breastmilk.  Discussed initiation of pumping, breast stimulation and milk production.  States "understand". Will initiate pumping upon moving to PP.  "

## 2017-12-19 NOTE — PROGRESS NOTES
"OB Ultrasound Report (see PDF version under imaging tab) and office visit    Indication  ========    F/U Consultation. Twin, Evaluation of fetal growth. BPP.    History  ======    General History  Height 165 cm  Height (ft) 5 ft  Height (in) 5 in  Other: OB: DESIREE SHOOK  Risk Factors  History risk factors: Multiple gestation  Details: mo-di twins    Pregnancy History  ===============    Maternal Lab Tests  Test: Sequential Screen part 2  Result: negative DSR <1:5000 (age DSR 1:1100)  Wants to know gender: yes    Maternal Assessment  ================    Weight 52 kg  Weight (lb) 114 lb  Height 165 cm  Height (ft) 5 ft  Height (in) 5 in  BP syst 126 mmHg  BP diast 80 mmHg  BMI 18.97 kg/m²    Method  ======    Transabdominal ultrasound examination.    Pregnancy  =========    Beltre pregnancy. Monochorionic-diamniotic. Number of fetuses: 2.    Dating  ======    Cycle: regular cycle  GA by "stated dating" 34 w + 3 d  PEDRO by "stated dating": 1/27/2018  Ultrasound examination on: 12/19/2017  GA by U/S based upon: AC, BPD, Femur, HC  GA by U/S 31 w + 3 d  PEDRO by U/S: 2/17/2018  GA by U/S based upon (Fetus 2): AC, BPD, Femur, HC  GA by U/S (Fetus 2) 33 w + 2 d  PEDRO by U/S (Fetus 2): 2/4/2018  Assigned: Dating performed on 10/12/2017, based on the external assessment  Assigned GA 34 w + 3 d  Assigned PEDRO: 1/27/2018    General Evaluation (1)  =================    Cardiac activity: present.  bpm.  Fetal movements: visualized.  Presentation: breech left.  Placenta: posterior.  Umbilical cord: 3 vessel cord.  Amniotic fluid: MVP 3.3 cm.    Fetal Biometry (1)  ==============    Fetal Biometry  BPD 79.7 mm 32w 0d Hadlock  .8 mm 35w 4d Jacki  .5 mm 33w 5d Hadlock  .2 mm 29w 6d Hadlock  Femur 57.7 mm 30w 1d Hadlock  EFW 1,578 g 2% Fernando  Calculated by: Hadlock (BPD-HC-AC-FL)  EFW (lb) 3 lb  EFW (oz) 8 oz  EFW discordance 27.4 %  Cephalic index 0.74  HC / AC 1.18  FL / BPD 0.72  FL / " AC 0.23  MVP 3.3 cm   bpm    Fetal Anatomy (1)  ==============    Cranium: normal  4-chamber view: documented previously  Stomach: normal  Kidneys: normal  Bladder: normal  Wants to know gender: yes    Fetal Doppler (1)  =============    Umbilical Cord  Umbilical A PS 38.28 cm/s  Umbilical A ED 6.71 cm/s  Umbilical A EDF: absent, intermittently  Umbilical A TAmax 21.74 cm/s  Umbilical A MD 5.73 cm/s  Umbilical A RI 0.82 >99% Teddy  Umbilical A PI 1.45 >99% Teddy  Umbilical A S / D 5.70 >99% Tedyd  Umbilical A  bpm  Head / Brain  Rt MCA PI divided by: Umbilical artery PI  Lt MCA PI divided by: Umbilical artery PI    General Evaluation (2)  =================    Cardiac activity: present.  bpm.  Fetal movements: visualized.  Presentation: cephalic right.  Placenta: posterior.  Amniotic fluid: MVP 2.9 cm.    Fetal Biometry (2)  ==============    Fetal Biometry  BPD 79.0 mm 31w 5d Hadlock  .1 mm 35w 5d Jacki  .2 mm 33w 5d Hadlock  .8 mm 32w 4d Hadlock  Femur 68.1 mm 35w 0d Hadlock  EFW 2,172 g 15% Fernando  Calculated by: Hadlock (BPD-HC-AC-FL)  EFW (lb) 4 lb  EFW (oz) 13 oz  EFW discordance 27.4 %  Cephalic index 0.73  HC / AC 1.06  FL / BPD 0.86  FL / AC 0.24  MVP 2.9 cm   bpm    Fetal Anatomy (2)  ==============    Cranium: normal  4-chamber view: documented previously  Stomach: normal  Kidneys: normal  Bladder: normal  Wants to know gender: yes    Fetal Doppler (2)  =============    Umbilical Cord  Umbilical artery: abnormal  Umbilical A PS -65.07 cm/s  Umbilical A ED -30.82 cm/s  Umbilical A TAmax -47.95 cm/s  Umbilical A MD -28.91 cm/s  Umbilical A RI 0.54 27% Teddy  Umbilical A PI 0.76 27% Teddy  Umbilical A S / D 2.16 24% Teddy  Umbilical A  bpm  Head / Brain  Rt MCA PI divided by: Umbilical artery PI  Lt MCA PI divided by: Umbilical artery PI    Impression  =========    The growth velocity has decelerated in twin A, and the EFW plots at the 2nd  percentile today. The BPP score is reassuring at 8/8;  however, UA doppler studies are abnormal with intermittent absent end-diastolic flow.  Interval growth for twin B has been stable with the EFW plotting at the 15th percentile today. The BPP score is reassuring at 8/8.  Fetal presentations are breech/vertex.    Recommendation  ==============    Delivery is recommended today/tomorrow due to IUGR with intermittent AEDV for twin A. Findings and recommendations discussed  with the patient and with Dr. Tucker today. As instructed by Dr. Tucker, the patient will present to L&D today for delivery.

## 2017-12-19 NOTE — L&D DELIVERY NOTE
Primary  Delivery Note    Date of Procedure: 2017    Surgeon: Elisa Tucker MD    Procedure: Primary  Delivery via Low Transverse Uterine Incision and Pfannensteil Incision    Pre-operative Diagnosis:   1. Mono/Di IUP @ 34w3d  2. Discordant growth  3. IUGR (A)  4. Intermittent absent end diastolic flow  5. Rh negative  6. Breech presentation of baby A    Post-operative Diagnosis: Same    Anesthesia: Spinal    Estimated Blood Loss: 455 cc    Specimen: Placenta    Complications: None    Findings: Baby A male infant in the complete breech presentation; Apgar 8/9; weight 3#15oz; Baby B male infant in the vertex presentation; Apgar 8/9; weight 4#8oz; Normal uterus, tubes and ovaries.    Indication and Consent: Patient presented to labor and delivery and was admitted for planned CD due to breech presentation of Baby A with intermittent absent end diastolic flow with intrauterine growth restriction and discordant growth.  CD recommended for fetal and maternal well being.  Risks, benefits and alternatives discussed, including, but not limited to: visceral and vascular injury, infection, blood loss, need for blood transfusion, prolonged hospitalization, and reoperation.  The patient stated understanding and desire to proceed. All questions were answered.  She declined permanent sterilization.    Procedure: Patient taken to the operating room with IVFs running.  SCDs placed for VTE prophylaxis.  Ancef was given for infection prophylaxis.  Spinal anesthesia was found to be adequate.  A grace catheter was placed to drain her bladder.  She was prepped and draped in normal sterile fashion in the dorsal supine position.  A Pfannensteil skin incision made with the scalpel.  This incision was carried down to the fascia with the Bovie.  The fascia was incised and this incision was extended laterally with curved lopez scissors.  Kocher clamps were used to grasp the superior edge of the fascia and the underlying  pyramidalis and rectus abdominus muscles were dissected off sharply with the curved lopez scissors.  Hemostasis was achieved with the Bovie.  The inferior edge of the fascia was grasped with the Kocher clamps and in a similar fashion, the underlying muscles were dissected off.  The muscles were  down the midline to the level of the pubic symphysis.  The peritoneum was identified and entered bluntly in an area free of any adherent bowel or bladder, and this opening was extended with gentle lateral traction.  A bladder blade was then placed into the abdomen.  A bladder flap was created by dissecting the vesicouterine peritoneum away from the lower uterine segment.  The bladder blade was removed and an Peng retractor was placed into the abdomen.  The lower uterine segment was incised in a transverse fashion, and this incision was extended laterally with blunt dissection.  Baby A was found to be in the complete breech presentation.  His pelvis was brought to the uterine incision, and a breech extraction was performed using the Pinard and Loveset maneuvers.  Using gentle fundal pressure, the fetal head was kept flexed and easily delivered.  The mouth and nose were bulb suctioned.  The cord was clamped x2 and cut.  The infant was handed to the awaiting  team.  Baby B was in the vertex presentation.  The head was gently guided to the uterine incision and with gentle fundal pressure the head was delivered followed by the rest of the body without any difficulty.  The mouth and nose were bulb suctioned.  The cord was clamped x2 and cut.  The infant was handed to the awaiting  team.  Cord blood samples was obtained.  The placenta was delivered spontaneously, intact.  IV Pitocin was administered to help facilitate uterine contractions.  The uterus, tubes and ovaries were exteriorized and found to be normal.  A laparotomy sponge was used to wipe the inside of the uterus to help remove any remaining  placental membranes.  The uterus was then closed with #1 chromic in a locking fashion.  A second imbricating layer with this same suture was then performed.  The uterine incision was inspected and found to be hemostatic.  A laparotomy sponge was used to wipe the blood clots and fluid in the abdomen and pelvis.  The uterus, tubes and ovaries were then placed back into the abdominal cavity.  The uterine incision was re-inspected and continued to be hemostatic.  The bladder flap was reapproximated with 2-0 vicryl in a continuous fashion.  The Peng retractor was removed.  The peritoneum was closed with 2-0 vicryl in a continuous fashion.  The muscle was reapproximated with U-stiches and 2-0 vicryl.  The fascia was closed in a running fashion using 0 vicryl.  The subcutaneous tissue was closed with 2-0 plain gut.  The skin was reapproximated with Insorb absorbable staples.    The instrument and sponge count were correct x2.  The patient tolerated the procedure well and was transferred to the next level of care in stable condition.

## 2017-12-19 NOTE — ANESTHESIA PREPROCEDURE EVALUATION
12/19/2017  Jaimie Francis is a 22 y.o., female.    Anesthesia Evaluation    I have reviewed the Patient Summary Reports.     I have reviewed the Medications.     Review of Systems  Anesthesia Hx:  No problems with previous Anesthesia   Denies Personal Hx of Anesthesia complications.   Hematology/Oncology:  Hematology Normal   Oncology Normal     EENT/Dental:EENT/Dental Normal   Cardiovascular:  Cardiovascular Normal     Pulmonary:  Pulmonary Normal    Renal/:  Renal/ Normal     Hepatic/GI:  Hepatic/GI Normal    Musculoskeletal:  Musculoskeletal Normal    Neurological:  Neurology Normal    Endocrine:  Endocrine Normal    Dermatological:  Skin Normal    Psych:  Psychiatric Normal           Physical Exam  General:  Well nourished    Airway/Jaw/Neck:  Airway Findings: Mouth Opening: Normal Tongue: Normal  Mallampati: II      Dental:  Dental Findings: In tact   Chest/Lungs:  Chest/Lungs Clear    Heart/Vascular:  Heart Findings: Normal Heart murmur: negative       Mental Status:  Mental Status Findings:  Cooperative, Alert and Oriented         Anesthesia Plan  Type of Anesthesia, risks & benefits discussed:  Anesthesia Type:  general, MAC, regional  Patient's Preference:   Intra-op Monitoring Plan: standard ASA monitors  Intra-op Monitoring Plan Comments:   Post Op Pain Control Plan: multimodal analgesia  Post Op Pain Control Plan Comments:   Induction:   IV  Beta Blocker:  Patient is not currently on a Beta-Blocker (No further documentation required).       Informed Consent: Patient understands risks and agrees with Anesthesia plan.  Questions answered. Anesthesia consent signed with patient.  ASA Score: 2     Day of Surgery Review of History & Physical:            Ready For Surgery From Anesthesia Perspective.

## 2017-12-19 NOTE — PLAN OF CARE
Problem: Patient Care Overview  Goal: Individualization & Mutuality  Outcome: Ongoing (interventions implemented as appropriate)  Vss. Pt. On dilaudid pca pump for pain relief. poc reviewed with pt. Pt. Pumping breasts, infants in nicu-phone number given. Britney to gravity draining well. See flowsheet for assessment. Family at pt. Bedside.

## 2017-12-19 NOTE — ANESTHESIA POSTPROCEDURE EVALUATION
"Anesthesia Post Evaluation    Patient: Jaimie Francis    Procedure(s) Performed: Procedure(s) (LRB):  DELIVERY- SECTION (N/A)    Final Anesthesia Type: spinal  Patient location during evaluation: PACU  Patient participation: Yes- Able to Participate  Level of consciousness: awake and alert, oriented and awake  Post-procedure vital signs: reviewed and stable  Pain management: adequate  Airway patency: patent  PONV status at discharge: No PONV  Anesthetic complications: no      Cardiovascular status: blood pressure returned to baseline  Respiratory status: unassisted and spontaneous ventilation  Hydration status: euvolemic  Follow-up not needed.        Visit Vitals  BP (!) 117/58 (BP Location: Left arm, Patient Position: Lying)   Pulse 81   Temp 36.4 °C (97.5 °F) (Oral)   Resp 16   Ht 5' 6" (1.676 m)   Wt 111.6 kg (246 lb)   LMP 04/15/2017   SpO2 98%   Breastfeeding? No   BMI 39.71 kg/m²       Pain/Diallo Score: No Data Recorded      "

## 2017-12-19 NOTE — LACTATION NOTE
12/19/17 1715   Maternal Infant Assessment   Breast Density Bilateral:;soft   Areola Bilateral:;dense   Nipple(s) Bilateral:;everted   Breasts WDL   Breasts WDL WDL       Number Scale   Presence of Pain denies   Location - Side Bilateral   Location breast   Maternal Infant Feeding   Maternal Emotional State relaxed;assist needed   Time Spent (min) 15-30 min   Breastfeeding Education milk expression, electric pump;label/storage of breast milk   Breastfeeding History   Breastfeeding History yes   Duration of Previous Breastfeeding few days   Equipment Type/Education   Pump Type Symphony   Breast Pump Type double electric, hospital grade   Breast Pump Flange Type hard   Breast Pump Flange Size 24 mm   Pumping Frequency (times) 1 # of times pumped   Lactation Referrals   Lactation Consult Initial assessment;Pump teaching;Knowledge deficit   Lactation Interventions   Breastfeeding Assistance milk expression/pumping   Maternal Breastfeeding Support encouragement offered;lactation counseling provided     Great Dream Symphony pump, tubing, collections containers and labels brought to bedside.  Discussed proper pump setting, Preemie+ for babys gestational age.  Instructed on proper usage of pump and to adjust suction according to maximum comfort level.  Verified appropriate flange fit.  Educated on the frequency and duration of pumping in order to promote and maintain a full milk supply.  Hands on pumping technique reviewed.  Encouraged hand expression after pumping.  Instructed on cleaning of breast pump parts.  Written instructions also given.  Pt verbalized understanding and appropriate recall for proper milk handling, collection, labeling, storage and transportation.

## 2017-12-19 NOTE — PLAN OF CARE
Problem: Patient Care Overview  Goal: Plan of Care Review  Outcome: Ongoing (interventions implemented as appropriate)  Plans to breastfeed babies.  To initiate breast pumping.

## 2017-12-19 NOTE — LETTER
December 19, 2017      Kelsey Oglesby MD  [address] [facility] [address]  [address]  [place] [address]           [facility]  [address]  [facility] [address]  Phone: [phone]          Patient: Jaimie Francis   MR Number: [id number]   YOB: 1995   Date of Visit: 12/19/2017       Dear Dr. Kelsey Oglesby:    Thank you for referring Jaimie Francis to me for evaluation. Attached you will find relevant portions of my assessment and plan of care.    If you have questions, please do not hesitate to call me. I look forward to following Jaimie Francis along with you.    Sincerely,    Jennifer Sanchez MD    Enclosure  CC:  No Recipients    If you would like to receive this communication electronically, please contact [email or web address] or [phone] to request more information on [employer] Link access.    For providers and/or their staff who would like to refer a patient to [facility], please contact us through our one-stop-shop provider referral line, [facility], at [phone].    If you feel you have received this communication in error or would no longer like to receive these types of communications, please e-mail [email or web address]

## 2017-12-19 NOTE — PROGRESS NOTES
Report to Dr. Tucker re patient's decel, interventions of 02 via mask, iv bolus and fetal recovery.. Dr. Tucker in route.

## 2017-12-19 NOTE — H&P
History and Physical - Obstetrics    Subjective:     Patient is a 22 y.o.  @ 34w3d gestational age with an Estimated Date of Delivery: 18, who was sent from [facility] office.  She has mono/di twins with discordant growth.  On today's ultrasound she started to have intermittent absent end-diastolic flow.  Recommendations are for delivery.  Baby A is breech.    Her current obstetrical history is significant for as above and Rh negative.    Review of Systems  Nine system ROS negative except for HPI    PTA Medications   Medication Sig    prenatal vit#103-iron-FA () 27 mg iron- 1 mg Tab Take 1 tablet by mouth once daily.       Review of patient's allergies indicates:  No Known Allergies     History reviewed. No pertinent past medical history.    History reviewed. No pertinent surgical history.    OB History      Para Term  AB Living    3 1 1   1 1    SAB TAB Ectopic Multiple Live Births    1     0 1          Social History     Social History    Marital status: Single     Spouse name: N/A    Number of children: N/A    Years of education: N/A     Occupational History     [employer] [facility]     Social History Main Topics    Smoking status: Never Smoker    Smokeless tobacco: Never Used    Alcohol use No    Drug use: No    Sexual activity: Yes     Partners: Male     Birth control/ protection: None     Other Topics Concern    Not on file     Social History Narrative    Works at [employer]       Family History   Problem Relation Age of Onset    No Known Problems Mother     No Known Problems Father     Diabetes Brother     Cancer Maternal Grandmother     Diabetes Maternal Grandmother     Hypertension Maternal Grandmother     Breast cancer Neg Hx     Colon cancer Neg Hx     Ovarian cancer Neg Hx     Arrhythmia Neg Hx     Cardiomyopathy Neg Hx     Congenital heart disease Neg Hx     Early death Neg Hx     Heart attacks under age 50 Neg Hx     Pacemaker/defibrilator  Neg Hx     Premature birth Neg Hx        Objective:   Vital Signs (Most Recent)  Temp: 98 °F (36.7 °C) (17 1223)  Pulse: 71 (17 1253)  Resp: 18 (17 1223)  BP: (!) 117/57 (17 1253)  SpO2: 100 % (17 1251)  Fetal Heart Tones:  Baby A with category 2 strip with deceleration  Baby A category 1    General: no acute distress, alert and oriented to person, place and time  Abdomen: gravid, no palpable contractions  Incision(s): none  Extremities: calves non-tender to palpation, no calf edema, erythema or palpable cords  Cervix: deferred    Laboratory: see results console    Assessment/Plan:     21 yo  @ 34w3d gestational age with mono/di twins, IUGR of Baby A with intermittent absent end-diastolic flow.  Recommendations are for delivery.  Baby A breech.  Recommend .    -SCDs  -Ancef

## 2017-12-19 NOTE — TRANSFER OF CARE
"Anesthesia Transfer of Care Note    Patient: Jaimie Francis    Procedure(s) Performed: Procedure(s) (LRB):  DELIVERY- SECTION (N/A)    Patient location: Labor and Delivery    Anesthesia Type: spinal    Transport from OR: Transported from OR on room air with adequate spontaneous ventilation    Post pain: adequate analgesia    Post assessment: no apparent anesthetic complications    Post vital signs: stable    Level of consciousness: awake, alert and oriented    Nausea/Vomiting: no nausea/vomiting    Complications: none    Transfer of care protocol was followed      Last vitals:   Visit Vitals  BP (!) 117/58 (BP Location: Left arm, Patient Position: Lying)   Pulse 81   Temp 36.4 °C (97.5 °F) (Oral)   Resp 16   Ht 5' 6" (1.676 m)   Wt 111.6 kg (246 lb)   LMP 04/15/2017   SpO2 98%   Breastfeeding? No   BMI 39.71 kg/m²     "

## 2017-12-19 NOTE — CONSULTS
Consulted by Obstetrician to  mom about prematurity.    Mother's history reviewed (including H&P and progress notes).  ________________________________________  ·   ________________________________________  Above copied from OB notes.    Talked to mom about prematurity and 34 wks twin with one twin IUGR wks GA babIES  I discussed in detail about possible complications, need for ventilator b/c of immature lungs and IV antibiotics for possible sepsis.   Discussed increased morbidity and mortality as compared to term babies.  Discussed developmental concerns and stay in NICU till near due date.   Provided information about NICU.    Mom verbalized understanding.    Please feel free to call us if mom has any other questions.     Thanks for the consult.    Dr. Demarco

## 2017-12-19 NOTE — ANESTHESIA PROCEDURE NOTES
Spinal    Diagnosis: IUP  Patient location during procedure: holding area  Start time: 12/19/2017 2:12 PM  Timeout: 12/19/2017 2:12 PM  End time: 12/19/2017 2:17 PM  Staffing  Anesthesiologist: GIO SOUZA  Performed: anesthesiologist   Preanesthetic Checklist  Completed: patient identified, site marked, surgical consent, pre-op evaluation, timeout performed, IV checked, risks and benefits discussed and monitors and equipment checked  Spinal Block  Patient position: sitting  Prep: ChloraPrep  Patient monitoring: heart rate, cardiac monitor and continuous pulse ox  Approach: midline  Location: L3-4  Injection technique: single shot  CSF Fluid: clear free-flowing CSF  Needle  Needle type: pencil-tip   Needle gauge: 25 G  Needle length: 3.5 in  Additional Documentation: incremental injection  Needle localization: anatomical landmarks  Assessment  Sensory level: T4   Dermatomal levels determined by pinch or prick  Ease of block: easy  Patient's tolerance of the procedure: comfortable throughout block and no complaints  Medications:  Bolus administered: 1.6 mL of 0.75 and with dextrose bupivacaine  Opioid administered: 15 mcg of   fentanyl

## 2017-12-20 LAB
ABO + RH BLD: NORMAL
BASOPHILS # BLD AUTO: 0.01 K/UL
BASOPHILS NFR BLD: 0.1 %
BLD GP AB SCN CELLS X3 SERPL QL: NORMAL
DIFFERENTIAL METHOD: ABNORMAL
EOSINOPHIL # BLD AUTO: 0.1 K/UL
EOSINOPHIL NFR BLD: 1.2 %
ERYTHROCYTE [DISTWIDTH] IN BLOOD BY AUTOMATED COUNT: 13.2 %
FETAL CELL SCN BLD QL ROSETTE: NORMAL
HCT VFR BLD AUTO: 29.9 %
HGB BLD-MCNC: 9.4 G/DL
INJECT RH IG VOL PATIENT: NORMAL ML
LYMPHOCYTES # BLD AUTO: 1.9 K/UL
LYMPHOCYTES NFR BLD: 23.9 %
MCH RBC QN AUTO: 25.4 PG
MCHC RBC AUTO-ENTMCNC: 31.4 G/DL
MCV RBC AUTO: 81 FL
MONOCYTES # BLD AUTO: 0.5 K/UL
MONOCYTES NFR BLD: 6.8 %
NEUTROPHILS # BLD AUTO: 5.3 K/UL
NEUTROPHILS NFR BLD: 68 %
PLATELET # BLD AUTO: 238 K/UL
PMV BLD AUTO: 9.5 FL
RBC # BLD AUTO: 3.7 M/UL
RPR SER QL: NORMAL
WBC # BLD AUTO: 7.75 K/UL

## 2017-12-20 PROCEDURE — 86901 BLOOD TYPING SEROLOGIC RH(D): CPT

## 2017-12-20 PROCEDURE — 63600519 RHOGAM PHARM REV CODE 636 ALT 250 W HCPCS: Performed by: OBSTETRICS & GYNECOLOGY

## 2017-12-20 PROCEDURE — 25000003 PHARM REV CODE 250: Performed by: OBSTETRICS & GYNECOLOGY

## 2017-12-20 PROCEDURE — 11000001 HC ACUTE MED/SURG PRIVATE ROOM

## 2017-12-20 PROCEDURE — 85461 HEMOGLOBIN FETAL: CPT

## 2017-12-20 PROCEDURE — 63600175 PHARM REV CODE 636 W HCPCS: Performed by: OBSTETRICS & GYNECOLOGY

## 2017-12-20 PROCEDURE — 36415 COLL VENOUS BLD VENIPUNCTURE: CPT

## 2017-12-20 PROCEDURE — 85025 COMPLETE CBC W/AUTO DIFF WBC: CPT

## 2017-12-20 PROCEDURE — 86900 BLOOD TYPING SEROLOGIC ABO: CPT

## 2017-12-20 RX ORDER — IBUPROFEN 600 MG/1
600 TABLET ORAL EVERY 6 HOURS
Status: DISCONTINUED | OUTPATIENT
Start: 2017-12-20 | End: 2017-12-23 | Stop reason: HOSPADM

## 2017-12-20 RX ADMIN — IBUPROFEN 600 MG: 600 TABLET, FILM COATED ORAL at 12:12

## 2017-12-20 RX ADMIN — OXYCODONE AND ACETAMINOPHEN 1 TABLET: 5; 325 TABLET ORAL at 05:12

## 2017-12-20 RX ADMIN — DOCUSATE SODIUM 200 MG: 100 CAPSULE, LIQUID FILLED ORAL at 08:12

## 2017-12-20 RX ADMIN — DOCUSATE SODIUM 200 MG: 100 CAPSULE, LIQUID FILLED ORAL at 09:12

## 2017-12-20 RX ADMIN — OXYCODONE HYDROCHLORIDE AND ACETAMINOPHEN 1 TABLET: 10; 325 TABLET ORAL at 02:12

## 2017-12-20 RX ADMIN — KETOROLAC TROMETHAMINE 30 MG: 30 INJECTION, SOLUTION INTRAMUSCULAR at 06:12

## 2017-12-20 RX ADMIN — OXYCODONE AND ACETAMINOPHEN 1 TABLET: 5; 325 TABLET ORAL at 12:12

## 2017-12-20 RX ADMIN — KETOROLAC TROMETHAMINE 30 MG: 30 INJECTION, SOLUTION INTRAMUSCULAR at 12:12

## 2017-12-20 RX ADMIN — IBUPROFEN 600 MG: 600 TABLET, FILM COATED ORAL at 05:12

## 2017-12-20 RX ADMIN — MUPIROCIN 1 G: 20 OINTMENT TOPICAL at 09:12

## 2017-12-20 RX ADMIN — HUMAN RHO(D) IMMUNE GLOBULIN 300 MCG: 300 INJECTION, SOLUTION INTRAMUSCULAR at 09:12

## 2017-12-20 RX ADMIN — MUPIROCIN 1 G: 20 OINTMENT TOPICAL at 08:12

## 2017-12-20 RX ADMIN — OXYCODONE AND ACETAMINOPHEN 1 TABLET: 5; 325 TABLET ORAL at 08:12

## 2017-12-20 NOTE — PLAN OF CARE
Problem: Patient Care Overview  Goal: Individualization & Mutuality  Outcome: Ongoing (interventions implemented as appropriate)  Vss. poc reviewed with pt. Pt. Tolerating pain with scheduled motrin and prn percocet. Ambulating to nicu to see infant. Voiding well, still not passing flatus. Pt. Pumping, enc. Pt. To pump 8x in a 24 hr. Period.

## 2017-12-20 NOTE — LACTATION NOTE
12/20/17 0845   Maternal Infant Assessment   Breast Density Bilateral:;soft   Breasts WDL   Breasts WDL WDL   Pain/Comfort Assessments   Pain Assessment Performed Yes       Number Scale   Presence of Pain denies   Location breast   Maternal Infant Feeding   Maternal Emotional State independent;relaxed   Presence of Pain no   Time Spent (min) 0-15 min   Breastfeeding Education importance of skin-to-skin contact;increasing milk supply   Equipment Type/Education   Pump Type Symphony   Breast Pump Type double electric, hospital grade   Breast Pump Flange Type hard   Breast Pump Flange Size 24 mm   Breast Pumping Bilateral Breasts:   Lactation Referrals   Lactation Consult Follow up;Pump teaching   Lactation Interventions   Attachment Promotion counseling provided;privacy provided;role responsibility promoted;skin-to-skin contact encouraged   Breastfeeding Assistance electric breast pump used;milk expression/pumping   Maternal Breastfeeding Support encouragement offered;lactation counseling provided   mother pumping independently -reinforced 8 times in 24 hours and skin to skin with twins -denies any breast or nipple discomfort getting a few drops- encouraged to call for any assistance

## 2017-12-20 NOTE — PROGRESS NOTES
Jaimie Francis is a 22 y.o. female patient.    History reviewed. No pertinent past medical history.    Review of patient's allergies indicates:  No Known Allergies    Vitals:    17 0811   BP: 124/71   Pulse: 77   Resp: 20   Temp: 97.5 °F (36.4 °C)       Post Op Day: 1    Subjective:  No Complaints    Objective:   Chest: Clear  Cor: Regular Rate Rhythm   Abdomen: Soft, POS BS  Incision: Clean, Dry, Intact    Assessment & Plan:   Normal Post Op   Routine care      LEMUEL CARDENAS  2017

## 2017-12-20 NOTE — PLAN OF CARE
"   12/20/17 1425   Discharge Assessment   Assessment Type Discharge Planning Assessment   Confirmed/corrected address and phone number on facesheet? Yes   Assessment information obtained from? Patient;Medical Record   Expected Length of Stay (days) 3   Communicated expected length of stay with patient/caregiver yes   Prior to hospitilization cognitive status: Alert/Oriented   Prior to hospitalization functional status: Independent   Current cognitive status: Alert/Oriented   Current Functional Status: Independent   Lives With parent(s);sibling(s);child(ricky), dependent   Is patient able to care for self after discharge? Yes   Who are your caregiver(s) and their phone number(s)? help at home parents, siblings   Patient's perception of discharge disposition home or selfcare   Readmission Within The Last 30 Days no previous admission in last 30 days   Patient currently being followed by outpatient case management? No   Patient currently receives home health services? No   Patient currently receives any other outside agency services? No   Equipment Currently Used at Home none   Do you have any problems affording any of your prescribed medications? No   Is the patient taking medications as prescribed? yes   Does the patient have transportation home? Yes   Transportation Available family or friend will provide   Does the patient receive services at the [facility]? No   Discharge Plan A Home with family;WIC   Patient/Family In Agreement With Plan yes   Readmission Questionnaire   Have you felt down, depressed, or hopeless? 0   Have you felt little interest or pleasure in doing things? 0   SW met with patient in [facility] Unit, explained role of SW/CM with treatment team, provided contact information with the "discharge planning begins on admission" checklist handout.   Confirmed information in demographics.   AMADOR reviewed the discharge planning folder, explained to leave in room with patient so that team/patient can place " "all written discharge information throughout hospital stay. Provided education regarding the importance of using written discharge information to help manage health care at home.   SW provided education regarding the importance of obtaining, taking all medications at discharge. Preferred pharmacy for mother is   [id number] [place] - [address]  [address] [address]  Phone: [phone] Fax: [phone]  .  SW provided education regarding importance of going to all follow up appointments to help manage health care at home. Jaclyn.   SW will follow with patient with twins in NICU and assist as needed.    copied from Baby B chart  NICU/MB/LD DISCHARGE ASSESSMENT     NAME: Ben Francis  DX:  , gestational age 34 completed weeks [P07.37]  Birth Hospital: [facility]                Birth Wt: 4# 8 oz  Birth Ln: 1' 5.52"  EGA: 34 w 3 d  PEDRO: 2018     DEMOGRAPHICS     Mother: Jaimie Francis  Address: [address] [place] [address] , [place] [address]  Phone: [phone]  Employer: [employer] ()     Father: not involved   Signed Birth Certificate: no     Support Persons: maternal grandmother, maternal cousin, aunt  Siblings:     CLINICAL     Pediatrician: Dr Rodriguez or Dr Howe  Pharmacy: [facility]     SW met with pt's mother and introduced herself to complete NICU assessment. Pt's mother was easily engaged. SW explained her role in . Pt's mother voiced understanding.      DIscharge planning assessment completed. Pt will be residing with mother, maternal grand parents and maternal siblings at current address. Pt's mother has basic essential needs such as crib and carseat. SW inquired about feedings. Mom voiced that she will be breast feeding pt. Mom is linked to WIC. SW informed mom of the importance of using a hospital grade pump and obtaining one from WIC. Mom voiced understanding. Mom has transportation to and from the " "hospital and for when Pt is discharged home.      Mom verified Pt's insurance. SW informed Mom of having pt added to her medicaid plan insurance within 30 days. Mom voiced understanding. SW providing written packet regarding [facility] Health Plans, SSI, Early Steps, Healthy Start, and Immunizations. Mom voiced understanding.      Mom has no concerns or questions at this time. SW will continue to follow Pt while in the NICU.       Copied from Baby A chart  NICU/MB/LD DISCHARGE ASSESSMENT    NAME: Jt  DX: 2017   , gestational age 34 completed weeks [P07.37]  Birth Hospital: [facility]    Birth Wt: 3# 15 oz oz  Birth Ln: 1'14.74"  EGA: 34 w 3 d  PEDRO: 2018    DEMOGRAPHICS    Mother: Jaimie Francis  Address: [address] [place] [address] , [place] [address]  Phone: [phone]  Employer: [employer] ()    Father: not currently involved  Signed Birth Certificate: no    Support Persons: maternal grandmother, cousin, aunt  Siblings: 2 yr old brother Gray    CLINICAL    Pediatrician: Dr Rodriguez or Dr Howe  Pharmacy: [facility]    SW met with pt's mother and introduced herself to complete NICU assessment. Pt's mother was easily engaged. SW explained her role in . Pt's mother voiced understanding. SW provided contact information, reviewed help at home, importance of follow up with medical appointments for infant and for herself.    DIscharge planning assessment completed. Pt will be residing with mother, maternal grandparents, maternal siblings and own twin and 2 year old sibling at current address. Pt's mother has basic essential needs such as crib and carseat. SW inquired about feedings. Mom voiced that she will be breast feeding pt. Mom is linked to WIC. SW informed mom of the importance of using a hospital grade pump and obtaining one from WIC. Mom voiced understanding. Mom has transportation to and from the hospital and for when Pt is discharged home. "     Mom verified Pt's insurance. SW informed Mom of having pt added to her medicaid plan insurance within 30 days. Mom voiced understanding. SW will provide written information regarding [facility] Health Plans, SSI, Early Steps, Healthy Start, and Immunizations. Mom voiced understanding.     Mom has no concerns or questions at this time. SW will continue to follow Pt while in the NICU.

## 2017-12-20 NOTE — PLAN OF CARE
Problem: Breastfeeding (Adult,Obstetrics,Pediatric)  Goal: Signs and Symptoms of Listed Potential Problems Will be Absent, Minimized or Managed (Breastfeeding)  Signs and symptoms of listed potential problems will be absent, minimized or managed by discharge/transition of care (reference Breastfeeding (Adult,Obstetrics,Pediatric) CPG).   Outcome: Ongoing (interventions implemented as appropriate)  Plan pumping at least 8 times in 24 hours while  form twins in NICU

## 2017-12-21 PROCEDURE — 11000001 HC ACUTE MED/SURG PRIVATE ROOM

## 2017-12-21 PROCEDURE — 25000003 PHARM REV CODE 250: Performed by: OBSTETRICS & GYNECOLOGY

## 2017-12-21 RX ORDER — FERROUS SULFATE 325(65) MG
325 TABLET, DELAYED RELEASE (ENTERIC COATED) ORAL DAILY
Status: DISCONTINUED | OUTPATIENT
Start: 2017-12-21 | End: 2017-12-23 | Stop reason: HOSPADM

## 2017-12-21 RX ADMIN — FERROUS SULFATE TAB EC 325 MG (65 MG FE EQUIVALENT) 325 MG: 325 (65 FE) TABLET DELAYED RESPONSE at 12:12

## 2017-12-21 RX ADMIN — MUPIROCIN 1 G: 20 OINTMENT TOPICAL at 09:12

## 2017-12-21 RX ADMIN — OXYCODONE AND ACETAMINOPHEN 1 TABLET: 5; 325 TABLET ORAL at 11:12

## 2017-12-21 RX ADMIN — DOCUSATE SODIUM 200 MG: 100 CAPSULE, LIQUID FILLED ORAL at 09:12

## 2017-12-21 RX ADMIN — OXYCODONE AND ACETAMINOPHEN 1 TABLET: 5; 325 TABLET ORAL at 06:12

## 2017-12-21 RX ADMIN — IBUPROFEN 600 MG: 600 TABLET, FILM COATED ORAL at 05:12

## 2017-12-21 RX ADMIN — OXYCODONE AND ACETAMINOPHEN 1 TABLET: 5; 325 TABLET ORAL at 12:12

## 2017-12-21 RX ADMIN — IBUPROFEN 600 MG: 600 TABLET, FILM COATED ORAL at 11:12

## 2017-12-21 RX ADMIN — OXYCODONE AND ACETAMINOPHEN 1 TABLET: 5; 325 TABLET ORAL at 05:12

## 2017-12-21 RX ADMIN — IBUPROFEN 600 MG: 600 TABLET, FILM COATED ORAL at 06:12

## 2017-12-21 RX ADMIN — IBUPROFEN 600 MG: 600 TABLET, FILM COATED ORAL at 12:12

## 2017-12-21 NOTE — LACTATION NOTE
12/20/17 2205   Breasts WDL   Breasts WDL WDL   Pain/Comfort Assessments   Pain Assessment Performed Yes   Acceptable Comfort Level 4   Fever Reduction/Comfort Measures fluid intake increased;lightweight bedding;lightweight clothing   (POSS) Pasero Opioid-Induced Sed Scale 1 - Awake and alert       Number Scale   Presence of Pain denies   Pain Rating: Rest 2   Pain Rating: Activity 2   Comfort/Acceptable Pain Level 4   Maternal Infant Feeding   Maternal Emotional State independent   Presence of Pain no   Breastfeeding Education adequate infant intake;milk expression, electric pump;label/storage of breast milk   Equipment Type/Education   Pump Type Symphony   Breast Pump Type double electric, hospital grade   Breast Pump Flange Type hard   Breast Pump Flange Size 24 mm   Breast Pumping Bilateral Breasts:   Pumping Frequency (times) 5 # of times pumped  (milk taken to NICU)   Lactation Interventions   Breastfeeding Assistance electric breast pump used;support offered   Maternal Breastfeeding Support encouragement offered;lactation counseling provided;maternal hydration promoted;maternal nutrition promoted;maternal rest encouraged

## 2017-12-21 NOTE — PLAN OF CARE
Problem: Patient Care Overview  Goal: Plan of Care Review  Outcome: Ongoing (interventions implemented as appropriate)  VSS, Encouraged to pump every 3 hours  and to wear supportive bra. Fundus and lochia WDL. LTVAI YOSEPH with SS intact and without s/sx of infection. BS+ 4 quads, states flatus, ambulating , voiding and tolerating regular diet. Bonding well with twins in NICU. Pain being managed with motrin and percocet, Stated an understanding to POC.

## 2017-12-21 NOTE — LACTATION NOTE
12/21/17 0804   Maternal Infant Assessment   Breast Density Bilateral:;soft   Areola Bilateral:;dense   Nipple(s) Right:;flat;Left:;everted   Breasts WDL   Breasts WDL WDL   Pain/Comfort Assessments   Pain Assessment Performed Yes       Number Scale   Presence of Pain denies   Location breast   Maternal Infant Feeding   Maternal Emotional State independent;relaxed   Presence of Pain no   Time Spent (min) 0-15 min   Breastfeeding Education milk expression, electric pump;increasing milk supply   Equipment Type/Education   Pump Type Symphony   Breast Pump Type double electric, hospital grade   Breast Pump Flange Type hard   Breast Pump Flange Size 24 mm;27 mm   Breast Pumping Bilateral Breasts:;milk labeled/stored   Lactation Referrals   Lactation Consult Follow up;Pump teaching   Lactation Interventions   Attachment Promotion counseling provided;privacy provided;role responsibility promoted   Breast Care: Breastfeeding milk massaged towards nipple   Breastfeeding Assistance electric breast pump used   Maternal Breastfeeding Support encouragement offered;lactation counseling provided   mother pumping independently -starting to collect a few mls from each side and denies any breast or nipple discomfort -reinforced frequent pumping 8 times in 24 hours and skin to skin with twins -encouraged to call for any assistance

## 2017-12-21 NOTE — PLAN OF CARE
Problem: Patient Care Overview  Goal: Plan of Care Review  Outcome: Ongoing (interventions implemented as appropriate)  Tolerating regular diet.  Voiding and passing flatus.  Ambulates to NICU without any complaints.  Verbalizes pain control with prn pain medication.  Pumping and manually expressing breast milk with increasing volume.  Verbalizes knowledge of plan of care

## 2017-12-21 NOTE — PLAN OF CARE
Problem: Breastfeeding (Adult,Obstetrics,Pediatric)  Goal: Signs and Symptoms of Listed Potential Problems Will be Absent, Minimized or Managed (Breastfeeding)  Signs and symptoms of listed potential problems will be absent, minimized or managed by discharge/transition of care (reference Breastfeeding (Adult,Obstetrics,Pediatric) CPG).   Outcome: Ongoing (interventions implemented as appropriate)  Plan pumping at least 8 times in 24 hours while  from twins in NICU

## 2017-12-21 NOTE — PROGRESS NOTES
Postop day 2  Patient doing well, no complaints. Ambulating. Lochia minimal,  denies n/v, denies h/a, vision changes, upper abd pain, chest pain, sob. Pain is controlled. Tolerating diet, PASSING flatus    Patient Active Problem List   Diagnosis    Rh negative state in antepartum period    At risk for infectious disease due to recent foreign travel    Monochorionic diamniotic twin pregnancy, antepartum    Twin pregnancy, twins discordant in second trimester    Polyhydramnios in second trimester    Fetal cardiac anomaly - possible small VSD    Pregnancy with one fetus       Temp:  [96 °F (35.6 °C)-99.5 °F (37.5 °C)] 96 °F (35.6 °C)  Pulse:  [59-78] 59  Resp:  [18-20] 20  BP: (105-124)/(54-71) 114/63  Alert, orientated  Regular rate  Normal resp effort  Abd soft nondistended fundus firm and appropriately tender  Incision clean, dry, intact; No erythema  Ext: no significant edema; nontender calves      Recent Labs  Lab 17  0659   WBC 7.75   HGB 9.4*   HCT 29.9*      GROUPTRH A NEG       A&P  22 y.o.  s/p c/d  Post op doing well.  Routine post op care.  Twins in NICU  Anemia- continue Iron

## 2017-12-22 ENCOUNTER — HOSPITAL ENCOUNTER (OUTPATIENT)
Dept: PERINATAL CARE | Facility: OTHER | Age: 22
Discharge: HOME | End: 2017-12-22

## 2017-12-22 PROCEDURE — 11000001 HC ACUTE MED/SURG PRIVATE ROOM

## 2017-12-22 PROCEDURE — 25000003 PHARM REV CODE 250: Performed by: OBSTETRICS & GYNECOLOGY

## 2017-12-22 RX ADMIN — OXYCODONE AND ACETAMINOPHEN 1 TABLET: 5; 325 TABLET ORAL at 11:12

## 2017-12-22 RX ADMIN — OXYCODONE AND ACETAMINOPHEN 1 TABLET: 5; 325 TABLET ORAL at 12:12

## 2017-12-22 RX ADMIN — MUPIROCIN 1 G: 20 OINTMENT TOPICAL at 08:12

## 2017-12-22 RX ADMIN — IBUPROFEN 600 MG: 600 TABLET, FILM COATED ORAL at 12:12

## 2017-12-22 RX ADMIN — OXYCODONE AND ACETAMINOPHEN 1 TABLET: 5; 325 TABLET ORAL at 07:12

## 2017-12-22 RX ADMIN — SIMETHICONE CHEW TAB 80 MG 80 MG: 80 TABLET ORAL at 08:12

## 2017-12-22 RX ADMIN — IBUPROFEN 600 MG: 600 TABLET, FILM COATED ORAL at 06:12

## 2017-12-22 RX ADMIN — FERROUS SULFATE TAB EC 325 MG (65 MG FE EQUIVALENT) 325 MG: 325 (65 FE) TABLET DELAYED RESPONSE at 08:12

## 2017-12-22 RX ADMIN — IBUPROFEN 600 MG: 600 TABLET, FILM COATED ORAL at 11:12

## 2017-12-22 RX ADMIN — DOCUSATE SODIUM 200 MG: 100 CAPSULE, LIQUID FILLED ORAL at 08:12

## 2017-12-22 RX ADMIN — OXYCODONE AND ACETAMINOPHEN 1 TABLET: 5; 325 TABLET ORAL at 05:12

## 2017-12-22 RX ADMIN — IBUPROFEN 600 MG: 600 TABLET, FILM COATED ORAL at 05:12

## 2017-12-22 RX ADMIN — MAGNESIUM HYDROXIDE 2400 MG: 400 SUSPENSION ORAL at 01:12

## 2017-12-22 NOTE — PROGRESS NOTES
Delivery Progress Note  Obstetrics        SUBJECTIVE:     Postpartum Day 3:  Delivery    Ms. Francis states she feels well. She denies emotional concerns. Her pain is well controlled with current medications. The babies are doing well in the NICU. The babies are feeding via ?. The patient is ambulating well. Ms. Francis is tolerating a normal diet. Flatus has been passed. Urinary output is adequate.    OBJECTIVE:     Vital Signs (Most Recent):  Temp: 96.7 °F (35.9 °C) (17 08)  Pulse: 70 (17 08)  Resp: 18 (17 08)  BP: 118/73 (17 08)  SpO2: 100 % (17 1650)    Vital Signs Range (Last 24H):  Temp:  [96.7 °F (35.9 °C)-98.2 °F (36.8 °C)]   Pulse:  [67-77]   Resp:  [18-20]   BP: (111-126)/(55-73)     I & O (Last 24H):No intake or output data in the 24 hours ending 17 1131  Physical Exam:  General:    no distress   Lungs:  clear to auscultation bilaterally   Heart:  regular rate and rhythm, S1, S2 normal, no murmur, click, rub or gallop   Breasts:  no discharge, erythema, or tenderness   Abdomen:  soft, non-tender; bowel sounds normal   Fundus:  firm   Incision:  healing well   Lochia:   scant rubra   DVT Evaluation:  No evidence of DVT on either side seen on physical exam.     Hemoglobin/Hematocrit    Recent Labs  Lab 17  0659   HGB 9.4*   HCT 29.9*     ABO/Rh  Lab Results   Component Value Date    GROUPTRH A NEG 2017     Rubella  No results for input(s): [id number] in the last 168 hours.    ASSESSMENT/PLAN:     Status post  section. Doing well postoperatively.     Continue current care.  Will discharge home tomorrow morning.     Jero Boogie MD

## 2017-12-22 NOTE — PLAN OF CARE
Problem: Patient Care Overview  Goal: Plan of Care Review  Outcome: Ongoing (interventions implemented as appropriate)  VSS. NADN. Respirations even & unlabored. Pain managed with scheduled and PRN meds. Patient is ambulating to and from NICU without assist. Low transverse incision remains well approximated with steri-strips in place. POC discussed with the patient, and the patient verbalized understanding.

## 2017-12-22 NOTE — PLAN OF CARE
Problem: Patient Care Overview  Goal: Plan of Care Review  Outcome: Ongoing (interventions implemented as appropriate)  VSS, pumping breast  encouraged 8 times in 24 hours. Wearing supportive bra. Fundus and lochia WDL. LTVAI YOSEPH with SS intact and without s/sx of infection. BS+ 4 quads, states flatus, ambulating , voiding and tolerating regular diet. Bonding well with twins in NICU. Pain being managed with motrin and percocet, Stated an understanding to POC.

## 2017-12-22 NOTE — DISCHARGE INSTRUCTIONS
After a Cesearean Birth    General Discharge Instructions  · May follow a regular diet, unless otherwise discussed with physician.  · Take showers, not baths unless otherwise discussed with physician.  · Activity as tolerated.  · No lifting or heavy exercise for 6 weeks, no driving for 2 weeks, no sexual intercourse, douching or tampons for 6 weeks  · May return to work/school as discussed with physician  · Rhogam injection given  _N/A____  · Rubella vaccine given __N/A___  (avoid pregnancy for 3 mos. after injection)  · Discuss birth control with physician  · Breast care support bra worn at all times  · Lactation consultant referral number ( [phone] or [phone])    Call Your Healthcare Provider Right Away If You Have:  · A fever of 101°F or higher.  · Incisional drainage  · Heavy vaginal bleeding, clots, or vaginal discharge with foul odor.  · Redness, discharge, or pain worse than you had in the hospital.  · Burning, pain, red streaks, or lumpy areas in your breasts.  · Cracks, blisters, or blood on your nipples.  · Burning or pain when you urinate.  · Persistent nausea or vomiting.  · Severe headaches, blurred vision, dizziness or fainting.  · Feelings of extreme sadness or anxiety, or a feeling that you dont want to be with your baby.  · No bowel movement for 5 days.  · Redness, warmth, swelling, or pain in the lower leg.    Incision Care  · You will be able to shower and pat the incision dry.  · Watch your incision for signs of infection, such as increasing redness or drainage.  · For ease of movement, hold a pillow against the incision when you get up from a lying or sitting position, and when you laugh or cough.  · Avoid heavy lifting--nothing heavier than your baby until your doctor instructs you otherwise.   Follow-Up  Schedule a  follow-up exam with your healthcare provider for about 6 weeks after delivery. During this exam, your uterus and vaginal area will be checked. Contact  your healthcare provider if you think you or your baby are having any problems.      Pumping for the Baby in NICU    Preparation and Hygiene:  Shower daily.  Wear a clean bra each day and wash daily in warm soapy water.  1. Change wet or moist breast pads frequently.  Moist pads can promote growth of germs.  2. Actively wash your hands, paying close attention to the area around and under your fingernails, thoroughly with soap and water for 15 seconds before pumping or handling your milk.  Re-wash your hands if you touch anything (scratching your nose, answering the phone, etc) while pumping or handling your milk.   3. Before pumping your breasts, assemble the pump collection kit and have ready the sterile container and labels.  Place these items on a clean surface next to the breast pump.  4. Each time after you have finished pumping, take apart all of the parts of the breast pump collection kit and place them in a separate cleaning container (do not place them in the sink).  Be sure to remove the yellow valve from the breast shield and separate the white membrane from the yellow valve.  Rinse all of these parts with cool water.  Then use a new sponge and/or bottle brush and dishwashing detergent to clean the parts.  Rinse off the soapy water with cool water and air dry on a clean towel covered with a clean cloth.  All parts may also be washed after each use in the top rack of a .  5. Once each day, sanitize all of the parts of the breast pump collection kit.  This can be done by boiling the kit parts for 10 minutes or by using a Quick Clean Micro-Steam Bag made by [employer]  6. If condensation appears in the tubing, continue to run the pump with the tubing attached for 1-2 minutes or until the tubing is dry.   7. Notify your babys nurse or doctor if you become ill or need to take any medication, even over-the-counter medicines.  Collection and Storage of Expressed Breast milk:       1. Pump your  breasts at least 8-10 times every 24 hours.  Double pump (both breasts at the same time) for at least 15-20 minutes using the most suction that is comfortable.    2. Write the date and time of pumping and the name of any medications you are taking on the babys pre-printed hospital identification label.   3. Place your babys pre-printed hospital identification label on each container of breast milk.  Additional pre-printed labels can be obtained from your babys nurse.  If your expressed breast milk is not correctly labeled, the nurse cannot feed the milk to the baby.       4.    Do not touch the inside of the storage containers or lids.  5.        Pour the amount of expressed milk needed for 1 of your babys feedings into each storage container.  Use a new container(s) for each pumping.  Additional storage containers can be obtained from your babys nurse.  6. Tightly screw the lid onto the container and place immediately into the refrigerator for daily transportation to the hospital.   Do not freeze your milk unless asked to do so by your babys nurse.  However, if you are not able to visit your baby each day, place the expressed breast milk in the freezer.  7.     Expressed breast milk should be refrigerated or frozen within 4 hours of pumping.  8.         Do not store expressed breast milk on the door of your refrigerator or freezer where the temperature is warmer.   Transportation of Expressed Breast milk:  1. Refrigerated breast milk or frozen milk should be packed tightly together in a cooler with frozen, gel-packs to keep the milk frozen.  DO NOT USE ICE CUBES (WET ICE) TO TRANSPORT FROZEN MILK.   A clean towel can be used to fill any extra space between containers of frozen milk.  2.    Bring your expressed milk from home each time you visit the baby.      Lactation Services - [phone]

## 2017-12-23 VITALS
DIASTOLIC BLOOD PRESSURE: 64 MMHG | SYSTOLIC BLOOD PRESSURE: 129 MMHG | OXYGEN SATURATION: 100 % | HEIGHT: 66 IN | WEIGHT: 246 LBS | TEMPERATURE: 97 F | RESPIRATION RATE: 18 BRPM | HEART RATE: 61 BPM | BODY MASS INDEX: 39.53 KG/M2

## 2017-12-23 PROCEDURE — 25000003 PHARM REV CODE 250: Performed by: OBSTETRICS & GYNECOLOGY

## 2017-12-23 RX ORDER — OXYCODONE AND ACETAMINOPHEN 5; 325 MG/1; MG/1
1 TABLET ORAL EVERY 4 HOURS PRN
Qty: 30 TABLET | Refills: 0 | Status: SHIPPED | OUTPATIENT
Start: 2017-12-23 | End: 2018-05-28

## 2017-12-23 RX ORDER — IBUPROFEN 600 MG/1
600 TABLET ORAL EVERY 6 HOURS
Qty: 60 TABLET | Refills: 0 | Status: SHIPPED | OUTPATIENT
Start: 2017-12-23 | End: 2018-05-28

## 2017-12-23 RX ORDER — FERROUS SULFATE 325(65) MG
325 TABLET, DELAYED RELEASE (ENTERIC COATED) ORAL DAILY
Refills: 0 | COMMUNITY
Start: 2017-12-24 | End: 2018-05-28

## 2017-12-23 RX ADMIN — IBUPROFEN 600 MG: 600 TABLET, FILM COATED ORAL at 12:12

## 2017-12-23 RX ADMIN — OXYCODONE AND ACETAMINOPHEN 1 TABLET: 5; 325 TABLET ORAL at 08:12

## 2017-12-23 RX ADMIN — DOCUSATE SODIUM 200 MG: 100 CAPSULE, LIQUID FILLED ORAL at 08:12

## 2017-12-23 RX ADMIN — MUPIROCIN 1 G: 20 OINTMENT TOPICAL at 08:12

## 2017-12-23 RX ADMIN — OXYCODONE AND ACETAMINOPHEN 1 TABLET: 5; 325 TABLET ORAL at 02:12

## 2017-12-23 RX ADMIN — FERROUS SULFATE TAB EC 325 MG (65 MG FE EQUIVALENT) 325 MG: 325 (65 FE) TABLET DELAYED RESPONSE at 08:12

## 2017-12-23 RX ADMIN — IBUPROFEN 600 MG: 600 TABLET, FILM COATED ORAL at 05:12

## 2017-12-23 NOTE — PLAN OF CARE
Problem: Patient Care Overview  Goal: Plan of Care Review  Pt passing flatus, voiding urine, tolerating PO, pain is mildly controlled with prn pain medication.   Pt does c/o dizziness during pumping. Encouraged to increase fluids, and increase feedings.   VSS  No acute distress  Aware of discharge suze  Ambulating/pumping for infants in NICU

## 2017-12-23 NOTE — DISCHARGE SUMMARY
22 y.o.   OB History      Para Term  AB Living    3 2 1 1 1 2    SAB TAB Ectopic Multiple Live Births    1     1 2      admitted for  (primary due to mono/ di twins w/ malpresentation and iugr).  Postpartum course unremarkable.    D/c home  Admit date 2017 11:02 AM  D/c date 2017  Discharge instructions - pelvic rest/ regular diet  Discharge followup 1 week for   Meds listed seperately

## 2017-12-23 NOTE — LACTATION NOTE
"   12/23/17 1204   Maternal Infant Assessment   Breast Density Bilateral:;full   Areola Bilateral:;dense   Nipple(s) Bilateral:;flat;graspable   Breasts WDL   Breasts WDL WDL       Number Scale   Presence of Pain denies   Location - Side Bilateral   Location breast   Maternal Infant Feeding   Maternal Emotional State relaxed;independent   Time Spent (min) 15-30 min   Equipment Type/Education   Pump Type Symphony   Breast Pump Type double electric, hospital grade   Breast Pump Flange Type hard   Breast Pump Flange Size 24 mm   Pumping Frequency (times) 8 # of times pumped   Lactation Referrals   Lactation Consult Follow up;Pump teaching;Knowledge deficit   Lactation Referrals outpatient lactation program;pediatric care provider;support group;WIC (women, infants and children) program   Lactation Interventions   Maternal Breastfeeding Support encouragement offered;lactation counseling provided   Pumping well 8 - 12 times in 24 hours with Symphony pump.  Appropriate labeling and storage of EBM noted.  Encouraged to call for assist prn.  Breastfeeding discharge instructions given with review of Mother's Breastfeeding Guide and Resource List.  FLORES pump given.  Encouraged to call hotline # prn.  States "understand" and verbalized appropriate recall.    "

## 2017-12-23 NOTE — PROGRESS NOTES
Verbalized understanding of discharge instructions. Patient on her way to NICU to drop off more expressed breastmilk for her . Family is waiting in the patient's room so they can all leave together.

## 2017-12-23 NOTE — PLAN OF CARE
Problem: Patient Care Overview  Goal: Plan of Care Review  Outcome: Outcome(s) achieved Date Met: 12/23/17  VSS. NADN. Respirations even & unlabored. Patient still reports dizziness and intermitten HAs after pumping her breast. Dr. Heath assessed patient and has issued orders for discharge with prescriptions for percocet, motrin and iron. Discharge orders in place. POC discussed with the patient, and the patient verbalized understanding. Patient is to follow-[facility] with [facility] in [facility] week.

## 2017-12-23 NOTE — PLAN OF CARE
Problem: Patient Care Overview  Goal: Plan of Care Review  Outcome: Ongoing (interventions implemented as appropriate)  Pumping well 8 - 12 times in 24 hours with Symphony pump.  Appropriate labeling and storage of EBM noted.  Encouraged to call for assist prn.

## 2017-12-27 PROBLEM — Z91.89 AT RISK FOR INFECTIOUS DISEASE DUE TO RECENT FOREIGN TRAVEL: Status: RESOLVED | Noted: 2017-06-12 | Resolved: 2017-12-27

## 2017-12-27 PROBLEM — Z34.90 PREGNANCY WITH ONE FETUS: Status: RESOLVED | Noted: 2017-12-19 | Resolved: 2017-12-27

## 2018-01-01 ENCOUNTER — TELEPHONE (OUTPATIENT)
Dept: OBSTETRICS AND GYNECOLOGY | Facility: HOSPITAL | Age: 23
End: 2018-01-01

## 2018-01-01 NOTE — TELEPHONE ENCOUNTER
"Lactation Telephone Follow up:  Spoke with pt.  Reports that she has been breastfeeding and pumping and bottle feeding EBM.  States that both babies are taking to breast well and taking at least their minimum of 40 ml.  Reports adequate output of a wet or stool with each feeding.  Continues to pump well without complications.  Still has FLORES pump and is trying to contact [facility] for pump.  Has appt with Dr Tejeda tomorrow at 10am and will go to [facility] office prior to appt.  Made aware that she has frozen EBM in the freezer in NICU.  States that she will  when she returns her flores pump tomorrow.  Denies any c/o or concerns.  Hotline # given.  States "understand" and verbalized appropriate recall.  "

## 2018-05-25 PROBLEM — O35.BXX0 FETAL CARDIAC ANOMALY AFFECTING PREGNANCY, ANTEPARTUM: Status: RESOLVED | Noted: 2017-11-27 | Resolved: 2018-05-25

## 2018-09-18 ENCOUNTER — HOSPITAL ENCOUNTER (OUTPATIENT)
Dept: OBSTETRICS AND GYNECOLOGY | Facility: HOSPITAL | Age: 23
Discharge: HOME | End: 2018-09-18
Attending: OBSTETRICS & GYNECOLOGY
Payer: MEDICAID

## 2018-09-18 DIAGNOSIS — O20.0 THREATENED ABORTION, ANTEPARTUM: ICD-10-CM

## 2018-09-18 LAB — INJECT RH IG VOL PATIENT: NORMAL ML

## 2018-09-18 PROCEDURE — 63600519 RHOGAM PHARM REV CODE 636 ALT 250 W HCPCS: Performed by: OBSTETRICS & GYNECOLOGY

## 2018-09-18 RX ADMIN — HUMAN RHO(D) IMMUNE GLOBULIN 300 MCG: 300 INJECTION, SOLUTION INTRAMUSCULAR at 04:09

## 2018-10-12 ENCOUNTER — HOSPITAL ENCOUNTER (EMERGENCY)
Facility: HOSPITAL | Age: 23
Discharge: HOME | End: 2018-10-12
Attending: EMERGENCY MEDICINE
Payer: MEDICAID

## 2018-10-12 VITALS
TEMPERATURE: 99 F | DIASTOLIC BLOOD PRESSURE: 70 MMHG | RESPIRATION RATE: 18 BRPM | BODY MASS INDEX: 40.18 KG/M2 | WEIGHT: 250 LBS | HEIGHT: 66 IN | OXYGEN SATURATION: 97 % | HEART RATE: 75 BPM | SYSTOLIC BLOOD PRESSURE: 132 MMHG

## 2018-10-12 DIAGNOSIS — R51.9 NONINTRACTABLE HEADACHE, UNSPECIFIED CHRONICITY PATTERN, UNSPECIFIED HEADACHE TYPE: ICD-10-CM

## 2018-10-12 DIAGNOSIS — R05.9 COUGH: ICD-10-CM

## 2018-10-12 DIAGNOSIS — O03.9 SPONTANEOUS ABORTION: Primary | ICD-10-CM

## 2018-10-12 DIAGNOSIS — R09.81 NASAL CONGESTION: ICD-10-CM

## 2018-10-12 DIAGNOSIS — J06.9 UPPER RESPIRATORY TRACT INFECTION, UNSPECIFIED TYPE: ICD-10-CM

## 2018-10-12 LAB
ALBUMIN SERPL BCP-MCNC: 3.3 G/DL
ALP SERPL-CCNC: 41 U/L
ALT SERPL W/O P-5'-P-CCNC: 13 U/L
ANION GAP SERPL CALC-SCNC: 8 MMOL/L
AST SERPL-CCNC: 14 U/L
B-HCG UR QL: POSITIVE
BACTERIA #/AREA URNS HPF: NORMAL /HPF
BASOPHILS # BLD AUTO: 0.03 K/UL
BASOPHILS NFR BLD: 0.5 %
BILIRUB SERPL-MCNC: 0.2 MG/DL
BILIRUB UR QL STRIP: NEGATIVE
BUN SERPL-MCNC: 6 MG/DL
CALCIUM SERPL-MCNC: 9.7 MG/DL
CHLORIDE SERPL-SCNC: 105 MMOL/L
CLARITY UR: CLEAR
CO2 SERPL-SCNC: 24 MMOL/L
COLOR UR: YELLOW
CREAT SERPL-MCNC: 0.7 MG/DL
CTP QC/QA: YES
DIFFERENTIAL METHOD: ABNORMAL
EOSINOPHIL # BLD AUTO: 0.2 K/UL
EOSINOPHIL NFR BLD: 3.6 %
ERYTHROCYTE [DISTWIDTH] IN BLOOD BY AUTOMATED COUNT: 12.7 %
EST. GFR  (AFRICAN AMERICAN): >60 ML/MIN/1.73 M^2
EST. GFR  (NON AFRICAN AMERICAN): >60 ML/MIN/1.73 M^2
GLUCOSE SERPL-MCNC: 86 MG/DL
GLUCOSE UR QL STRIP: NEGATIVE
HCG INTACT+B SERPL-ACNC: NORMAL MIU/ML
HCT VFR BLD AUTO: 35.1 %
HGB BLD-MCNC: 11.8 G/DL
HGB UR QL STRIP: ABNORMAL
KETONES UR QL STRIP: NEGATIVE
LEUKOCYTE ESTERASE UR QL STRIP: NEGATIVE
LYMPHOCYTES # BLD AUTO: 2.6 K/UL
LYMPHOCYTES NFR BLD: 44.1 %
MCH RBC QN AUTO: 29.1 PG
MCHC RBC AUTO-ENTMCNC: 33.6 G/DL
MCV RBC AUTO: 87 FL
MICROSCOPIC COMMENT: NORMAL
MONOCYTES # BLD AUTO: 0.4 K/UL
MONOCYTES NFR BLD: 7.2 %
NEUTROPHILS # BLD AUTO: 2.6 K/UL
NEUTROPHILS NFR BLD: 44.6 %
NITRITE UR QL STRIP: NEGATIVE
PH UR STRIP: 7 [PH] (ref 5–8)
PLATELET # BLD AUTO: 295 K/UL
PMV BLD AUTO: 9.6 FL
POTASSIUM SERPL-SCNC: 4 MMOL/L
PROT SERPL-MCNC: 7.7 G/DL
PROT UR QL STRIP: NEGATIVE
RBC # BLD AUTO: 4.06 M/UL
RBC #/AREA URNS HPF: 2 /HPF (ref 0–4)
SODIUM SERPL-SCNC: 137 MMOL/L
SP GR UR STRIP: 1.02 (ref 1–1.03)
SQUAMOUS #/AREA URNS HPF: 4 /HPF
URN SPEC COLLECT METH UR: ABNORMAL
UROBILINOGEN UR STRIP-ACNC: NEGATIVE EU/DL
WBC # BLD AUTO: 5.8 K/UL
WBC #/AREA URNS HPF: 1 /HPF (ref 0–5)

## 2018-10-12 PROCEDURE — 93010 ELECTROCARDIOGRAM REPORT: CPT | Mod: ,,, | Performed by: INTERNAL MEDICINE

## 2018-10-12 PROCEDURE — 36000 PLACE NEEDLE IN VEIN: CPT

## 2018-10-12 PROCEDURE — 99285 EMERGENCY DEPT VISIT HI MDM: CPT | Mod: 25

## 2018-10-12 PROCEDURE — 25000003 PHARM REV CODE 250: Performed by: PHYSICIAN ASSISTANT

## 2018-10-12 PROCEDURE — 81025 URINE PREGNANCY TEST: CPT | Performed by: NURSE PRACTITIONER

## 2018-10-12 PROCEDURE — 85025 COMPLETE CBC W/AUTO DIFF WBC: CPT

## 2018-10-12 PROCEDURE — 93005 ELECTROCARDIOGRAM TRACING: CPT

## 2018-10-12 PROCEDURE — 80053 COMPREHEN METABOLIC PANEL: CPT

## 2018-10-12 PROCEDURE — 84702 CHORIONIC GONADOTROPIN TEST: CPT

## 2018-10-12 PROCEDURE — 81000 URINALYSIS NONAUTO W/SCOPE: CPT

## 2018-10-12 RX ORDER — AZITHROMYCIN 250 MG/1
250 TABLET, FILM COATED ORAL DAILY
Qty: 4 TABLET | Refills: 0 | Status: SHIPPED | OUTPATIENT
Start: 2018-10-13 | End: 2018-10-17

## 2018-10-12 RX ORDER — FLUTICASONE PROPIONATE 50 MCG
1 SPRAY, SUSPENSION (ML) NASAL 2 TIMES DAILY PRN
Qty: 15 G | Refills: 0 | Status: SHIPPED | OUTPATIENT
Start: 2018-10-12 | End: 2018-10-26

## 2018-10-12 RX ORDER — ACETAMINOPHEN 500 MG
500 TABLET ORAL
Status: COMPLETED | OUTPATIENT
Start: 2018-10-12 | End: 2018-10-12

## 2018-10-12 RX ORDER — BENZONATATE 100 MG/1
100 CAPSULE ORAL 3 TIMES DAILY PRN
Qty: 20 CAPSULE | Refills: 0 | Status: SHIPPED | OUTPATIENT
Start: 2018-10-12 | End: 2018-10-19

## 2018-10-12 RX ORDER — AZITHROMYCIN 250 MG/1
250 TABLET, FILM COATED ORAL DAILY
Qty: 4 TABLET | Refills: 0 | Status: SHIPPED | OUTPATIENT
Start: 2018-10-13 | End: 2018-10-12 | Stop reason: SDUPTHER

## 2018-10-12 RX ORDER — AZITHROMYCIN 250 MG/1
500 TABLET, FILM COATED ORAL
Status: COMPLETED | OUTPATIENT
Start: 2018-10-12 | End: 2018-10-12

## 2018-10-12 RX ORDER — BENZONATATE 100 MG/1
100 CAPSULE ORAL
Status: COMPLETED | OUTPATIENT
Start: 2018-10-12 | End: 2018-10-12

## 2018-10-12 RX ORDER — ACETAMINOPHEN 500 MG
500 TABLET ORAL EVERY 4 HOURS PRN
Qty: 20 TABLET | Refills: 0 | Status: SHIPPED | OUTPATIENT
Start: 2018-10-12 | End: 2018-10-17

## 2018-10-12 RX ADMIN — ACETAMINOPHEN 500 MG: 500 TABLET, FILM COATED ORAL at 09:10

## 2018-10-12 RX ADMIN — AZITHROMYCIN MONOHYDRATE 500 MG: 250 TABLET ORAL at 10:10

## 2018-10-12 RX ADMIN — BENZONATATE 100 MG: 100 CAPSULE ORAL at 09:10

## 2018-10-12 NOTE — ED PROVIDER NOTES
Encounter Date: 10/12/2018    This is a SORT/MSE of a 23 y.o. female presenting to the ED with c/o cough with epigastric, lower midsternal chest pain and congestion. Care will be transferred to an alternate provider when patient is roomed for a full evaluation and final disposition. SELVIN Tompkins, FNP-C 10/12/2018 6:45 PM    SCRIBE #1 NOTE: IShelly am scribing for, and in the presence of,  Ngozi Mccabe PA-C. I have scribed the following portions of the note - Other sections scribed: HPI and ROS.       History     Chief Complaint   Patient presents with    Nasal Congestion     pt states congested for the past three days; pt also c/o headache above the eyes    Headache    Chest Pain     pt states midsternal chest pain while at rest     CC: Nasal Congestion    HPI: This 23 y.o female presents to the ED c/o acute onset, constant nasal congestion with associated headache, sneezing, and midsternal chest pain for the past 3 days.  Patient also reports of associated sinus pain and sinus pressure.  Patient denies fever, chills, nausea, emesis, diarrhea, constipation, shortness of breath, or any other associated symptoms.  She reports attempting treatment for her chest pain with Pepto bismuth and Tums.  No alleviating factors. Denies history of DVT/PE in self or family, leg swelling, recent travel, trauma, surgery history of cancer, hemoptysis. Pt reports unaware of pregnancy prior to positive UPT today. Review of chart shows pt saw Dr. Ekaterina MAYES last month 18 for threatened  and was treated with Rhogam at that time. Pt states she had heavy bleeding 3 days ago that resolved 2 days ago (3-4 pads/day) that is now only spotting. Denies abdominal pain, pelvic pain, dizziness, lightheadedness.       The history is provided by the patient. No  was used.     Review of patient's allergies indicates:  No Known Allergies  History reviewed. No pertinent past medical  history.  Past Surgical History:   Procedure Laterality Date    DELIVERY- SECTION N/A 2017    Performed by Elisa Tucker MD at [facility] L[facility]D [facility]     Family History   Problem Relation Age of Onset    No Known Problems Mother     No Known Problems Father     Diabetes Brother     Cancer Maternal Grandmother     Diabetes Maternal Grandmother     Hypertension Maternal Grandmother     Breast cancer Neg Hx     Colon cancer Neg Hx     Ovarian cancer Neg Hx     Arrhythmia Neg Hx     Cardiomyopathy Neg Hx     Congenital heart disease Neg Hx     Early death Neg Hx     Heart attacks under age 50 Neg Hx     Pacemaker/defibrilator Neg Hx     Premature birth Neg Hx      Social History     Tobacco Use    Smoking status: Never Smoker    Smokeless tobacco: Never Used   Substance Use Topics    Alcohol use: No    Drug use: No     Review of Systems   Constitutional: Negative for chills and fever.   HENT: Positive for congestion, sinus pressure, sinus pain and sneezing. Negative for ear pain and sore throat.    Eyes: Negative for visual disturbance.   Respiratory: Negative for cough.    Cardiovascular: Positive for chest pain.   Gastrointestinal: Negative for abdominal pain, diarrhea, nausea and vomiting.   Genitourinary: Negative for dysuria, frequency, hematuria and urgency.   Musculoskeletal: Negative for back pain, myalgias and neck pain.   Skin: Negative for rash.   Neurological: Positive for headaches. Negative for dizziness, weakness, light-headedness and numbness.   Psychiatric/Behavioral: Negative for confusion.       Physical Exam     Initial Vitals [10/12/18 1846]   BP Pulse Resp Temp SpO2   136/72 85 20 98.2 °F (36.8 °C) 99 %      MAP       --         Physical Exam    Nursing note and vitals reviewed.  Constitutional: She appears well-developed and well-nourished.   HENT:   Head: Normocephalic.   Right Ear: Hearing, tympanic membrane, external ear and ear canal normal.   Left Ear: Hearing, tympanic  membrane, external ear and ear canal normal.   Nose: Rhinorrhea present. No mucosal edema. Right sinus exhibits frontal sinus tenderness. Right sinus exhibits no maxillary sinus tenderness. Left sinus exhibits frontal sinus tenderness. Left sinus exhibits no maxillary sinus tenderness.   Mouth/Throat: Uvula is midline and oropharynx is clear and moist.   Postnasal drip    Eyes: Conjunctivae are normal.   Cardiovascular: Normal rate and regular rhythm. Exam reveals no gallop and no friction rub.    No murmur heard.  Pulmonary/Chest: Effort normal and breath sounds normal. She has no decreased breath sounds. She has no wheezes. She has no rhonchi. She has no rales.   Abdominal: Soft. Bowel sounds are normal. She exhibits no distension. There is no tenderness. There is no rebound, no guarding, no tenderness at McBurney's point and negative Sands's sign.   Genitourinary:   Genitourinary Comments: Pelvic pain: scant amount of dark blood from cervical os. No POC visualized. No adnexal TTP or masses. No CMT. Cervical os clsoed.     Musculoskeletal: Normal range of motion.   No medial thigh popliteal or calf TTP    Lymphadenopathy:     She has no cervical adenopathy.   Neurological: She is alert. She has normal strength. No cranial nerve deficit or sensory deficit.   Skin: Skin is warm and dry.   Psychiatric: She has a normal mood and affect.         ED Course   Procedures  Labs Reviewed   CBC W/ AUTO DIFFERENTIAL - Abnormal; Notable for the following components:       Result Value    Hemoglobin 11.8 (*)     Hematocrit 35.1 (*)     All other components within normal limits   COMPREHENSIVE METABOLIC PANEL - Abnormal; Notable for the following components:    Albumin 3.3 (*)     Alkaline Phosphatase 41 (*)     All other components within normal limits   URINALYSIS, REFLEX TO URINE CULTURE - Abnormal; Notable for the following components:    Occult Blood UA 2+ (*)     All other components within normal limits    Narrative:      Preferred Collection Type->Urine, Clean Catch   POCT URINE PREGNANCY - Abnormal; Notable for the following components:    POC Preg Test, Ur Positive (*)     All other components within normal limits   HCG, QUANTITATIVE, PREGNANCY   URINALYSIS MICROSCOPIC    Narrative:     Preferred Collection Type->Urine, Clean Catch          Imaging Results          US OB Less Than 14 Wks with Transvag(xpd (Final result)  Result time 10/12/18 21:27:12    Final result by Maria L Francis MD (10/12/18 21:27:12)                 Impression:      No intrauterine pregnancy or gestational sac visualized, technically pregnancy of unknown location.  Findings may relate to early pregnancy or spontaneous .  No adnexal abnormalities or significant free fluid seen to suggest ectopic pregnancy.  Recommend serial beta hCGs and repeat pelvic ultrasound as clinically warranted.      Electronically signed by: Maria L Francis MD  Date:    10/12/2018  Time:    21:27             Narrative:    EXAMINATION:  US OB LESS THAN 14 WKS WITH TRANSVAGINAL (XPD)    CLINICAL HISTORY:  Vag Bleeding;    TECHNIQUE:  Transabdominal sonography of the pelvis was performed, followed by transvaginal sonography to better evaluate the uterus and ovaries.    COMPARISON:  None.    FINDINGS:  The uterus measures 10.2 x 6.6 x 8.6 cm. Uterine parenchyma is heterogenous in echotexture.  No intrauterine pregnancy or gestational sac visualized.  There is complex heterogenous thickened appearance of the endometrium.    The right ovary measures 3.7 x 2.6 x 2.4 cm. The left ovary measures 3.1 x 2.3 x 2.3 cm. Arterial and venous flow are preserved bilaterally.  No adnexal abnormalities are seen.  No significant free fluid is seen.                                 Medical Decision Making:   Initial Assessment:   22 y/o female (twins)A1 with no pertinent past medical history 3 day history of nasal congestion, sneezing, frontal HA and sinus pressure. 4/10. Pt additional c/o  midsternal chest pain worse with movement. 4/10. Pt has positive UPT at this facility. States that she was unaware of pregnancy. Reports heavy vaginal bleeding 3-4 pads / day 3 days ago and 2 days ago. States she is currently spotting. Review of chart shows she saw Dr. Tobar last month for late period with clotting. Pt had positive UPT on  and was treated with Rhogam by OBGYN and had repeat HCG drawn that was increasing. Chart review shows that the pt was informed of rising hcg and to follow up with OBGYN. Pt reportedly did not get the message and states she was unaware of pregnancy. 2 weeks ago HCG 54328. Denies abdominal pain or pelvic pain. Ectopic workup initiated. HCG 60528, decreasing. US with no IUP. No suspicious masses for ectopic. Think this is spontaneous AB. Discussed with  Pt. Pt here for URI symptoms. States her mother is here diagnosed with PNA. initially CXR cancelled due to positive UPT. Will tx for possible PNA with azithromcyin due to close contact at home. OBGYN follow up in 2 days.  ER return precautions discussed worsening symptoms increased bleeding, dizziness, lightheaddness or as needed. Discussed with Dr. Patel who agrees with assessment and plan.             Scribe Attestation:   Scribe #1: I performed the above scribed service and the documentation accurately describes the services I performed. I attest to the accuracy of the note.    Attending Attestation:           Physician Attestation for Scribe:  Physician Attestation Statement for Scribe #1: I, Ngozi Mccabe PA-C, reviewed documentation, as scribed by Shelly Gold in my presence, and it is both accurate and complete.                    Clinical Impression:   The primary encounter diagnosis was Spontaneous . Diagnoses of Cough, Nonintractable headache, unspecified chronicity pattern, unspecified headache type, Nasal congestion, and Upper respiratory tract infection, unspecified type were also pertinent to this  visit.                              Ngozi Mccabe PA-C  10/12/18 8993

## 2018-10-13 NOTE — ED NOTES
"Patient presented to the ED stating that "I was really congested and I have a pain in the middle of my chest." Patient denies that chest pain radiates anywhere. Patient stated that the pain hurts when she moves. Patient denies any NVD. Patient denies any trauma to chest area. Patient stated feeling this way for the last 3 days.  "

## 2018-10-13 NOTE — DISCHARGE INSTRUCTIONS
Take Tylenol for pain, Tessalon for cough, Flonase for nasal congestion.     Follow up with primary care in 2 days. Return to ER for worsening symptoms, dizziness, lightheadedness or as needed.

## 2019-05-19 ENCOUNTER — HOSPITAL ENCOUNTER (EMERGENCY)
Facility: HOSPITAL | Age: 24
Discharge: HOME | End: 2019-05-19
Attending: EMERGENCY MEDICINE
Payer: MEDICAID

## 2019-05-19 VITALS
BODY MASS INDEX: 36.96 KG/M2 | RESPIRATION RATE: 16 BRPM | SYSTOLIC BLOOD PRESSURE: 122 MMHG | OXYGEN SATURATION: 99 % | DIASTOLIC BLOOD PRESSURE: 69 MMHG | HEART RATE: 64 BPM | HEIGHT: 66 IN | TEMPERATURE: 99 F | WEIGHT: 230 LBS

## 2019-05-19 DIAGNOSIS — R51.9 SINUS HEADACHE: Primary | ICD-10-CM

## 2019-05-19 LAB
B-HCG UR QL: NEGATIVE
CTP QC/QA: YES

## 2019-05-19 PROCEDURE — 99284 EMERGENCY DEPT VISIT MOD MDM: CPT | Mod: 25

## 2019-05-19 PROCEDURE — 81025 URINE PREGNANCY TEST: CPT | Performed by: EMERGENCY MEDICINE

## 2019-05-19 RX ORDER — PSEUDOEPHEDRINE HYDROCHLORIDE 60 MG/1
60 TABLET ORAL EVERY 6 HOURS PRN
Qty: 12 TABLET | Refills: 0 | Status: SHIPPED | OUTPATIENT
Start: 2019-05-19 | End: 2019-05-29

## 2019-05-19 RX ORDER — FLUTICASONE PROPIONATE 50 MCG
1 SPRAY, SUSPENSION (ML) NASAL 2 TIMES DAILY PRN
Qty: 15 G | Refills: 0 | Status: SHIPPED | OUTPATIENT
Start: 2019-05-19 | End: 2020-02-15

## 2019-05-20 NOTE — ED TRIAGE NOTES
Patient arrived to ED with c/o frontal headache and sinus pressure behind eyes x 1 day.  Denies fever, blurred vision, weakness, or n/v.  No acute distress noted

## 2019-05-20 NOTE — ED PROVIDER NOTES
Encounter Date: 2019       History     Chief Complaint   Patient presents with    Headache     Patient reports a frontal headache and periorbital pain x 2 days. Denies nausea, vomiting, fevers, chills, cough, nasal congestion, sore throat, ear pain.    Eye Problem     Patient is a 24-year-old female presenting to the ER for evaluation of frontal headache. Patient states over the last 2 days she has had a frontal headache and pressure around her periorbital region.  She states that the pain has been constant over the last 2 days.  She has taken some ibuprofen with some alleviation of her symptoms. She denies any associated blurred vision or visual disturbances.  No paresthesia or focal weakness or extremity.  No head injury or trauma.  Denies any neck pain or stiffness.  No fevers or chills at home.  No history of migraines.  She denies any fevers or chills, no cough congestion URI symptoms. No eye pain, eye drainage    The history is provided by the patient.     Review of patient's allergies indicates:  No Known Allergies  History reviewed. No pertinent past medical history.  Past Surgical History:   Procedure Laterality Date    DELIVERY- SECTION N/A 2017    Performed by Elisa Tucker MD at [facility] L&D OR     Family History   Problem Relation Age of Onset    No Known Problems Mother     No Known Problems Father     Diabetes Brother     Cancer Maternal Grandmother     Diabetes Maternal Grandmother     Hypertension Maternal Grandmother     Breast cancer Neg Hx     Colon cancer Neg Hx     Ovarian cancer Neg Hx     Arrhythmia Neg Hx     Cardiomyopathy Neg Hx     Congenital heart disease Neg Hx     Early death Neg Hx     Heart attacks under age 50 Neg Hx     Pacemaker/defibrilator Neg Hx     Premature birth Neg Hx      Social History     Tobacco Use    Smoking status: Never Smoker    Smokeless tobacco: Never Used   Substance Use Topics    Alcohol use: No    Drug use: No     Review of  Systems   Constitutional: Negative for chills and fever.   HENT: Positive for sinus pressure and sinus pain. Negative for congestion, ear pain and sore throat.    Eyes: Negative for photophobia, pain, redness and visual disturbance.   Respiratory: Negative for cough and shortness of breath.    Cardiovascular: Negative for chest pain.   Gastrointestinal: Negative for nausea and vomiting.   Musculoskeletal: Negative for myalgias, neck pain and neck stiffness.   Skin: Negative for rash.   Allergic/Immunologic: Negative for immunocompromised state.   Neurological: Positive for headaches. Negative for dizziness, syncope, weakness, light-headedness and numbness.   Hematological: Does not bruise/bleed easily.   Psychiatric/Behavioral: Negative for confusion.       Physical Exam     Initial Vitals [05/19/19 2108]   BP Pulse Resp Temp SpO2   122/69 64 16 98.5 °F (36.9 °C) 99 %      MAP       --         Physical Exam    Vitals reviewed.  Constitutional: She appears well-developed and well-nourished. She is not diaphoretic. No distress.   HENT:   Head: Normocephalic and atraumatic. Head is without right periorbital erythema and without left periorbital erythema.   Nose: Mucosal edema present. No rhinorrhea. Right sinus exhibits maxillary sinus tenderness. Right sinus exhibits no frontal sinus tenderness. Left sinus exhibits maxillary sinus tenderness. Left sinus exhibits no frontal sinus tenderness.   Mouth/Throat: Oropharynx is clear and moist.   Eyes: Conjunctivae and EOM are normal. Pupils are equal, round, and reactive to light.   Neck: Neck supple.   Cardiovascular: Normal rate, regular rhythm, normal heart sounds and intact distal pulses.   Pulmonary/Chest: Breath sounds normal.   Neurological: She is alert and oriented to person, place, and time. She has normal strength. No sensory deficit. Coordination and gait normal. GCS score is 15. GCS eye subscore is 4. GCS verbal subscore is 5. GCS motor subscore is 6.    finger-nose-finger normal, heel-knee-shin     Skin: Skin is warm and dry.         ED Course   Procedures  Labs Reviewed   POCT URINE PREGNANCY          Imaging Results    None                APC / Resident Notes:   Patient seen in the ER promptly upon arrival.  She is afebrile, no acute distress. Physical examination reveals mucosal edema to bilateral nares.  She does have some tenderness on palpation of the maxillary sinuses bilaterally. Feel that symptoms are likely secondary to a sinus headache.  Will prescribed home on Flonase and pseudoephedrine to use as directed.  Patient advised to take Tylenol Motrin in addition for pain or fever.  Do not feel that patient requires antibiotics as symptoms have been persistent for 2 days.  She is to follow up with family doctor this week.  She is stable for discharge and close follow-up.                 Clinical Impression:       ICD-10-CM ICD-9-CM   1. Sinus headache R51 784.0         Disposition:   Disposition: Discharged  Condition: Stable                        Virginie Jefferson PA-C  05/19/19 8620

## 2019-05-20 NOTE — DISCHARGE INSTRUCTIONS
Please take medication as prescribed.  You may take Tylenol or Motrin in addition to these medications.  Follow up with family doctor this week.

## 2019-09-01 PROCEDURE — 93005 ELECTROCARDIOGRAM TRACING: CPT

## 2019-09-01 PROCEDURE — 93010 EKG 12-LEAD: ICD-10-PCS | Mod: ,,, | Performed by: INTERNAL MEDICINE

## 2019-09-01 PROCEDURE — 93010 ELECTROCARDIOGRAM REPORT: CPT | Mod: ,,, | Performed by: INTERNAL MEDICINE

## 2019-09-01 PROCEDURE — 99284 EMERGENCY DEPT VISIT MOD MDM: CPT | Mod: 25

## 2019-09-02 ENCOUNTER — HOSPITAL ENCOUNTER (EMERGENCY)
Facility: HOSPITAL | Age: 24
Discharge: HOME | End: 2019-09-02
Attending: EMERGENCY MEDICINE
Payer: MEDICAID

## 2019-09-02 VITALS
BODY MASS INDEX: 38.25 KG/M2 | HEIGHT: 66 IN | TEMPERATURE: 98 F | WEIGHT: 238 LBS | HEART RATE: 71 BPM | SYSTOLIC BLOOD PRESSURE: 114 MMHG | DIASTOLIC BLOOD PRESSURE: 61 MMHG | OXYGEN SATURATION: 100 % | RESPIRATION RATE: 19 BRPM

## 2019-09-02 DIAGNOSIS — R07.9 CHEST PAIN: ICD-10-CM

## 2019-09-02 PROCEDURE — 25000003 PHARM REV CODE 250: Performed by: EMERGENCY MEDICINE

## 2019-09-02 RX ORDER — ACETAMINOPHEN 325 MG/1
650 TABLET ORAL EVERY 6 HOURS PRN
Qty: 13 TABLET | Refills: 0 | Status: SHIPPED | OUTPATIENT
Start: 2019-09-02 | End: 2020-02-15

## 2019-09-02 RX ADMIN — LIDOCAINE HYDROCHLORIDE: 20 SOLUTION ORAL; TOPICAL at 01:09

## 2019-09-02 NOTE — ED PROVIDER NOTES
Encounter Date: 2019    SCRIBE #1 NOTE: IMj, am scribing for, and in the presence of, Zachary Boogie MD. Other sections scribed: HPI, ROS, PE.       History     Chief Complaint   Patient presents with    Chest Pain     C/o non radiating chest pain and headache x 1 day. Denies NV     This is a 24 y.o. female with a PMHx of GERD who presents to the ED complaining of non radiating midline chest pain. Pain rated 5/10. She ate lasagna prior to the onset of symptoms. She had symptoms like this before which showed gastric reflux.  Denies N/V, rashes, diarrhea,  or any other worsening or alleviating factors. NKDA. The pt does not smoke. Denies any drug or alcohol use. LNMP was at the beginning of this week.    The history is provided by the patient and medical records.     Review of patient's allergies indicates:  No Known Allergies  No past medical history on file.  Past Surgical History:   Procedure Laterality Date    DELIVERY- SECTION N/A 2017    Performed by Elisa Tucker MD at [facility] L&D OR     Family History   Problem Relation Age of Onset    No Known Problems Mother     No Known Problems Father     Diabetes Brother     Cancer Maternal Grandmother     Diabetes Maternal Grandmother     Hypertension Maternal Grandmother     Breast cancer Neg Hx     Colon cancer Neg Hx     Ovarian cancer Neg Hx     Arrhythmia Neg Hx     Cardiomyopathy Neg Hx     Congenital heart disease Neg Hx     Early death Neg Hx     Heart attacks under age 50 Neg Hx     Pacemaker/defibrilator Neg Hx     Premature birth Neg Hx      Social History     Tobacco Use    Smoking status: Never Smoker    Smokeless tobacco: Never Used   Substance Use Topics    Alcohol use: No    Drug use: No     Review of Systems   Constitutional: Negative for chills and fever.   HENT: Negative for congestion.    Eyes: Negative for pain.   Respiratory: Negative for cough and shortness of breath.    Cardiovascular: Positive for chest  pain.   Gastrointestinal: Negative for nausea and vomiting.   Endocrine: Negative for cold intolerance and heat intolerance.   Genitourinary: Negative for dysuria.   Musculoskeletal: Negative for back pain.   Skin: Negative for rash.   Neurological: Positive for headaches. Negative for dizziness, syncope and light-headedness.   Psychiatric/Behavioral: Negative for confusion.   All other systems reviewed and are negative.      Physical Exam     Initial Vitals [09/02/19 0001]   BP Pulse Resp Temp SpO2   113/66 70 18 98.1 °F (36.7 °C) 100 %      MAP       --         Physical Exam    Nursing note and vitals reviewed.  Constitutional: She appears well-nourished. She is not diaphoretic. No distress.   Pt is overweight   HENT:   Head: Normocephalic and atraumatic.   Mouth/Throat: Oropharynx is clear and moist.   Eyes: EOM are normal. Pupils are equal, round, and reactive to light. No scleral icterus.   Neck: Normal range of motion. Neck supple. No JVD present.   Cardiovascular: Normal rate, regular rhythm and intact distal pulses.   Pulmonary/Chest: Breath sounds normal. No stridor. No respiratory distress.   Abdominal: Soft. Bowel sounds are normal. She exhibits no distension. There is no tenderness.   Musculoskeletal: Normal range of motion. She exhibits no edema or tenderness.   Neurological: She is alert and oriented to person, place, and time. She has normal strength. No cranial nerve deficit or sensory deficit.   Skin: Skin is warm and dry.   Psychiatric: She has a normal mood and affect.         ED Course   Procedures  Labs Reviewed   POCT URINE PREGNANCY     EKG Readings: (Independently Interpreted)   EKG done 03/20/2051 on September 1, 2018 showing normal sinus rhythm rate of 78.  No ST elevation T-wave abnormality.  Normal axis QRS.  Nonspecific EKG.       Imaging Results          X-Ray Chest PA And Lateral (In process)                  Medical Decision Making:   History:   Old Medical Records: I decided to  obtain old medical records.  Initial Assessment:   24 year old patient with hx of obesity presenting secondary to chest pain. Patient is at lower risk of ACS due to risk factors and HPI. EKG was reassuring and chest xray showed nothing acute. Most likely gastritis.  Given GI cocktail    [email or web address]    Also considered but less likely:     PE: normal rate, no sob/recent immobilization/surgery/travel/family history. PERC 0  Pneumonia: chest xray negative. No fever. No cough and lungs non consistent with pna  Tamponade: unlikely due to chest xray and ekg  STEMI: No STEMI on ekg  Dissection: equal pulses bilaterally and no ripping chest pain to the back  Esophageal rupture: no dysphagia or vomiting and chest xray negative for mediastinal air  Arrhythmia: no arrhythmia on ekg  CHF: no fluid overload on Cxr and physical exam  Pneumothorax: bilateral breath sounds and no signs of pneumothorax on chest xray     Patient felt improved with interventions and has a lower risk of impending cardiac failure. Return precautions given, patient understands and agrees with plan. All questions answered.  Instructed to follow up with PCP.  Discharged with Tylenol and ranitidine. I discussed with the patient the diagnosis, treatment plan, indications for return to the emergency department, and for expected follow-up. The patient verbalized an understanding. The patient is asked if there are any questions or concerns. We discuss the case, until all issues are addressed to the patient's satisfaction. Patient understands and is agreeable to the plan.  Urine pregnancy negative.  Zachary Boogie      Clinical Tests:   Lab Tests: Ordered and Reviewed  Radiological Study: Reviewed and Ordered  Medical Tests: Ordered and Reviewed            Scribe Attestation:   Scribe #1: I performed the above scribed service and the documentation accurately describes the services I performed. I attest to the accuracy of  the note.               Clinical Impression:       ICD-10-CM ICD-9-CM   1. Chest pain R07.9 786.50   2. Chest pain R07.9 786.50        I, Zachary Boogie, personally performed the services described in this documentation. All medical record entries made by the scribe were at my direction and in my presence.  I have reviewed the chart and agree that the record reflects my personal performance and is accurate and complete                          Zachary Boogie MD  09/02/19 0102

## 2020-02-14 PROCEDURE — 81025 URINE PREGNANCY TEST: CPT | Performed by: NURSE PRACTITIONER

## 2020-02-14 PROCEDURE — 99283 EMERGENCY DEPT VISIT LOW MDM: CPT | Mod: 25

## 2020-02-15 ENCOUNTER — HOSPITAL ENCOUNTER (EMERGENCY)
Facility: HOSPITAL | Age: 25
Discharge: HOME | End: 2020-02-15
Attending: EMERGENCY MEDICINE
Payer: COMMERCIAL

## 2020-02-15 VITALS
HEART RATE: 70 BPM | WEIGHT: 220 LBS | DIASTOLIC BLOOD PRESSURE: 64 MMHG | OXYGEN SATURATION: 100 % | BODY MASS INDEX: 35.36 KG/M2 | HEIGHT: 66 IN | SYSTOLIC BLOOD PRESSURE: 120 MMHG | TEMPERATURE: 98 F | RESPIRATION RATE: 16 BRPM

## 2020-02-15 DIAGNOSIS — L02.01 CUTANEOUS ABSCESS OF FACE: Primary | ICD-10-CM

## 2020-02-15 LAB
B-HCG UR QL: NEGATIVE
CTP QC/QA: YES

## 2020-02-15 RX ORDER — CEPHALEXIN 500 MG/1
500 CAPSULE ORAL EVERY 6 HOURS
Qty: 20 CAPSULE | Refills: 0 | Status: SHIPPED | OUTPATIENT
Start: 2020-02-15 | End: 2020-02-20

## 2020-02-15 NOTE — ED PROVIDER NOTES
"Encounter Date: 2/14/2020       History     Chief Complaint   Patient presents with    Abscess     "I had a little bump the other day to the left side of my mouth and now its swollen and hurts". Reports pain to the left inner jaw accompanied with tingling sensations and pain to the left eye. Took Benadryl around 4pm without relief.      Chief Complaint:  Abscess  History of  Present Illness: History obtained from patient. This 24 y.o. female who has no known past medical history presents to the ED complaining of abscess to the face that has been gradually worsening for 2 days.  She attended treatment with Benadryl without relief.  Denies difficulty swallowing, shortness of breath, cough, fever, chills, nausea vomiting.        Review of patient's allergies indicates:  No Known Allergies  History reviewed. No pertinent past medical history.  History reviewed. No pertinent surgical history.  Family History   Problem Relation Age of Onset    No Known Problems Mother     No Known Problems Father     Diabetes Brother     Cancer Maternal Grandmother     Diabetes Maternal Grandmother     Hypertension Maternal Grandmother     Breast cancer Neg Hx     Colon cancer Neg Hx     Ovarian cancer Neg Hx     Arrhythmia Neg Hx     Cardiomyopathy Neg Hx     Congenital heart disease Neg Hx     Early death Neg Hx     Heart attacks under age 50 Neg Hx     Pacemaker/defibrilator Neg Hx     Premature birth Neg Hx      Social History     Tobacco Use    Smoking status: Never Smoker    Smokeless tobacco: Never Used   Substance Use Topics    Alcohol use: No    Drug use: No     Review of Systems   Constitutional: Negative for chills and fever.   HENT: Positive for facial swelling. Negative for congestion, rhinorrhea, sore throat and trouble swallowing.    Eyes: Negative for visual disturbance.   Respiratory: Negative for cough and shortness of breath.    Cardiovascular: Negative for chest pain.   Gastrointestinal: Negative " for abdominal pain, diarrhea, nausea and vomiting.   Genitourinary: Negative for dysuria, frequency and hematuria.   Musculoskeletal: Negative for back pain.   Skin: Negative for rash.   Neurological: Negative for dizziness, weakness and headaches.       Physical Exam     Initial Vitals [02/14/20 2318]   BP Pulse Resp Temp SpO2   124/72 65 20 98.5 °F (36.9 °C) 100 %      MAP       --         Physical Exam    Nursing note and vitals reviewed.  Constitutional: She appears well-developed and well-nourished. No distress.   HENT:   Head: Normocephalic and atraumatic.   Right Ear: Tympanic membrane normal.   Left Ear: Tympanic membrane normal.   Nose: Nose normal.   Mouth/Throat: Uvula is midline, oropharynx is clear and moist and mucous membranes are normal. No trismus in the jaw.   Patient has poor dentition with multiple dental caries without fluctuance to the gingiva.  There is no tenderness to the teeth.  Airway is patent.   Eyes: EOM are normal. Pupils are equal, round, and reactive to light.   Neck: Trachea normal, normal range of motion, full passive range of motion without pain and phonation normal. Neck supple. No stridor present. No spinous process tenderness and no muscular tenderness present. Normal range of motion present. No neck rigidity.   Cardiovascular: Normal rate, regular rhythm and normal heart sounds. Exam reveals no gallop and no friction rub.    No murmur heard.  Pulmonary/Chest: Effort normal and breath sounds normal. No respiratory distress. She has no wheezes. She has no rhonchi. She has no rales.   Abdominal: Soft. Bowel sounds are normal. There is no tenderness. There is no rebound and no guarding.   Musculoskeletal: Normal range of motion.   Neurological: She is alert and oriented to person, place, and time. She has normal strength. No cranial nerve deficit or sensory deficit.   Skin: Skin is warm and dry. Capillary refill takes less than 2 seconds.   There is a pustule with surrounding  erythema and minimal induration to the face just lateral to the left oral commissure.   Psychiatric: She has a normal mood and affect.         ED Course   Procedures  Labs Reviewed   POCT URINE PREGNANCY          Imaging Results    None          Medical Decision Making:   ED Management:  This is an evaluation of a 24 y.o. female that presents to the Emergency Department for an abscess. Physical Exam shows a non-toxic, afebrile, and well appearing female.  There is a 0.5 cm pustule to the face just lateral to the left oral commissure.  Posterior oropharynx is clear.  Airway is patent. Patient has poor dentition with multiple dental caries without fluctuance or tenderness to the teeth.  No evidence of dental abscess.  There is not active drainage.     Vital Signs Are Reassuring.     My overall impression is facial abscess.  I considered, but at this time, do not suspect an extensive cellulitis, sepsis, or bacteremia to warrant admission.    Will discharge patient home with Keflex.  Additional D/C Information: warm compresses 10-15 minutes, 4-5 times a day to help increase wound drainage and decrease swelling. The diagnosis, treatment plan, instructions for follow-up and reevaluation with her PCP or the ED in 2 - 3 days for wound recheck as well as ED return precautions were discussed and understanding was verbalized. All questions or concerns have been addressed.                                    Clinical Impression:       ICD-10-CM ICD-9-CM   1. Cutaneous abscess of face L02.01 682.0                             Obie Koroma PA-C  02/15/20 0245

## 2021-05-11 ENCOUNTER — OFFICE VISIT (OUTPATIENT)
Dept: URGENT CARE | Facility: CLINIC | Age: 26
End: 2021-05-11
Payer: COMMERCIAL

## 2021-05-11 VITALS
HEART RATE: 66 BPM | HEIGHT: 66 IN | WEIGHT: 220 LBS | OXYGEN SATURATION: 98 % | SYSTOLIC BLOOD PRESSURE: 123 MMHG | RESPIRATION RATE: 18 BRPM | DIASTOLIC BLOOD PRESSURE: 83 MMHG | TEMPERATURE: 98 F | BODY MASS INDEX: 35.36 KG/M2

## 2021-05-11 DIAGNOSIS — M79.632 PAIN IN LEFT FOREARM: ICD-10-CM

## 2021-05-11 DIAGNOSIS — Z02.83 ENCOUNTER FOR DRUG SCREENING: ICD-10-CM

## 2021-05-11 DIAGNOSIS — S46.912A MUSCLE STRAIN OF LEFT UPPER ARM, INITIAL ENCOUNTER: Primary | ICD-10-CM

## 2021-05-11 LAB — BREATH ALCOHOL: 0

## 2021-05-11 PROCEDURE — 73090 X-RAY EXAM OF FOREARM: CPT | Mod: LT,S$GLB,, | Performed by: RADIOLOGY

## 2021-05-11 PROCEDURE — 73090 XR FOREARM LEFT: ICD-10-PCS | Mod: LT,S$GLB,, | Performed by: RADIOLOGY

## 2021-05-11 PROCEDURE — 99203 OFFICE O/P NEW LOW 30 MIN: CPT | Mod: S$GLB,,, | Performed by: NURSE PRACTITIONER

## 2021-05-11 PROCEDURE — 80305 OOH NON-DOT DRUG SCREEN: ICD-10-PCS | Mod: S$GLB,,, | Performed by: NURSE PRACTITIONER

## 2021-05-11 PROCEDURE — 82075 POCT BREATH ALCOHOL TEST: ICD-10-PCS | Mod: S$GLB,,, | Performed by: NURSE PRACTITIONER

## 2021-05-11 PROCEDURE — 80305 DRUG TEST PRSMV DIR OPT OBS: CPT | Mod: S$GLB,,, | Performed by: NURSE PRACTITIONER

## 2021-05-11 PROCEDURE — 99203 PR OFFICE/OUTPT VISIT, NEW, LEVL III, 30-44 MIN: ICD-10-PCS | Mod: S$GLB,,, | Performed by: NURSE PRACTITIONER

## 2021-05-11 PROCEDURE — 82075 ASSAY OF BREATH ETHANOL: CPT | Mod: S$GLB,,, | Performed by: NURSE PRACTITIONER

## 2021-05-12 ENCOUNTER — OFFICE VISIT (OUTPATIENT)
Dept: URGENT CARE | Facility: CLINIC | Age: 26
End: 2021-05-12
Payer: COMMERCIAL

## 2021-05-12 VITALS
HEART RATE: 58 BPM | RESPIRATION RATE: 18 BRPM | WEIGHT: 220 LBS | OXYGEN SATURATION: 97 % | DIASTOLIC BLOOD PRESSURE: 67 MMHG | TEMPERATURE: 98 F | HEIGHT: 66 IN | SYSTOLIC BLOOD PRESSURE: 99 MMHG | BODY MASS INDEX: 35.36 KG/M2

## 2021-05-12 DIAGNOSIS — S50.12XA CONTUSION OF LEFT FOREARM, INITIAL ENCOUNTER: Primary | ICD-10-CM

## 2021-05-12 DIAGNOSIS — S46.912A STRAIN OF LEFT SHOULDER, INITIAL ENCOUNTER: ICD-10-CM
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
## 2021-05-12 PROCEDURE — 99214 OFFICE O/P EST MOD 30 MIN: CPT | Mod: S$GLB,,, | Performed by: PHYSICIAN ASSISTANT

## 2021-05-12 PROCEDURE — 99214 PR OFFICE/OUTPT VISIT, EST, LEVL IV, 30-39 MIN: ICD-10-PCS | Mod: S$GLB,,, | Performed by: PHYSICIAN ASSISTANT

## 2021-05-12 RX ORDER — IBUPROFEN 600 MG/1
600 TABLET ORAL EVERY 6 HOURS PRN
Qty: 30 TABLET | Refills: 1 | Status: SHIPPED | OUTPATIENT
Start: 2021-05-12 | End: 2021-06-11

## 2021-05-18 ENCOUNTER — OFFICE VISIT (OUTPATIENT)
Dept: URGENT CARE | Facility: CLINIC | Age: 26
End: 2021-05-18
Payer: OTHER MISCELLANEOUS

## 2021-05-18 DIAGNOSIS — S46.912D STRAIN OF LEFT SHOULDER, SUBSEQUENT ENCOUNTER: ICD-10-CM

## 2021-05-18 DIAGNOSIS — S50.12XD CONTUSION OF LEFT FOREARM, SUBSEQUENT ENCOUNTER: Primary | ICD-10-CM
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
## 2021-05-18 PROCEDURE — 99214 PR OFFICE/OUTPT VISIT, EST, LEVL IV, 30-39 MIN: ICD-10-PCS | Mod: S$GLB,,, | Performed by: PHYSICIAN ASSISTANT

## 2021-05-18 PROCEDURE — 99214 OFFICE O/P EST MOD 30 MIN: CPT | Mod: S$GLB,,, | Performed by: PHYSICIAN ASSISTANT

## 2021-05-25 ENCOUNTER — OFFICE VISIT (OUTPATIENT)
Dept: URGENT CARE | Facility: CLINIC | Age: 26
End: 2021-05-25
Payer: OTHER MISCELLANEOUS

## 2021-05-25 DIAGNOSIS — S50.12XD CONTUSION OF LEFT FOREARM, SUBSEQUENT ENCOUNTER: Primary | ICD-10-CM

## 2021-05-25 DIAGNOSIS — S46.912D STRAIN OF LEFT SHOULDER, SUBSEQUENT ENCOUNTER: ICD-10-CM
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
## 2021-05-25 PROCEDURE — 99214 OFFICE O/P EST MOD 30 MIN: CPT | Mod: S$GLB,,, | Performed by: PHYSICIAN ASSISTANT

## 2021-05-25 PROCEDURE — 99214 PR OFFICE/OUTPT VISIT, EST, LEVL IV, 30-39 MIN: ICD-10-PCS | Mod: S$GLB,,, | Performed by: PHYSICIAN ASSISTANT

## 2023-02-20 ENCOUNTER — HOSPITAL ENCOUNTER (EMERGENCY)
Facility: HOSPITAL | Age: 28
Discharge: HOME | End: 2023-02-20
Attending: EMERGENCY MEDICINE
Payer: COMMERCIAL

## 2023-02-20 VITALS
OXYGEN SATURATION: 98 % | WEIGHT: 230 LBS | HEART RATE: 72 BPM | DIASTOLIC BLOOD PRESSURE: 84 MMHG | HEIGHT: 66 IN | RESPIRATION RATE: 15 BRPM | TEMPERATURE: 98 F | BODY MASS INDEX: 36.96 KG/M2 | SYSTOLIC BLOOD PRESSURE: 135 MMHG

## 2023-02-20 DIAGNOSIS — R11.2 NAUSEA VOMITING AND DIARRHEA: Primary | ICD-10-CM

## 2023-02-20 DIAGNOSIS — R19.7 NAUSEA VOMITING AND DIARRHEA: Primary | ICD-10-CM

## 2023-02-20 DIAGNOSIS — R10.84 GENERALIZED ABDOMINAL CRAMPING: ICD-10-CM

## 2023-02-20 LAB
B-HCG UR QL: NEGATIVE
CTP QC/QA: YES

## 2023-02-20 PROCEDURE — 81025 URINE PREGNANCY TEST: CPT | Performed by: PHYSICIAN ASSISTANT

## 2023-02-20 PROCEDURE — 99284 EMERGENCY DEPT VISIT MOD MDM: CPT

## 2023-02-20 PROCEDURE — 25000003 PHARM REV CODE 250: Performed by: PHYSICIAN ASSISTANT

## 2023-02-20 RX ORDER — ONDANSETRON 8 MG/1
8 TABLET, ORALLY DISINTEGRATING ORAL
Status: COMPLETED | OUTPATIENT
Start: 2023-02-20 | End: 2023-02-20

## 2023-02-20 RX ORDER — DICYCLOMINE HYDROCHLORIDE 10 MG/1
20 CAPSULE ORAL
Status: COMPLETED | OUTPATIENT
Start: 2023-02-20 | End: 2023-02-20

## 2023-02-20 RX ORDER — ONDANSETRON 4 MG/1
8 TABLET, FILM COATED ORAL EVERY 6 HOURS PRN
Qty: 30 TABLET | Refills: 0 | Status: SHIPPED | OUTPATIENT
Start: 2023-02-20 | End: 2023-03-22

## 2023-02-20 RX ORDER — DICYCLOMINE HYDROCHLORIDE 20 MG/1
20 TABLET ORAL 4 TIMES DAILY
Qty: 12 TABLET | Refills: 0 | Status: SHIPPED | OUTPATIENT
Start: 2023-02-20 | End: 2023-02-23

## 2023-02-20 RX ADMIN — ONDANSETRON 8 MG: 8 TABLET, ORALLY DISINTEGRATING ORAL at 09:02

## 2023-02-20 RX ADMIN — DICYCLOMINE HYDROCHLORIDE 20 MG: 10 CAPSULE ORAL at 09:02

## 2023-02-20 NOTE — ED TRIAGE NOTES
Peñaeliezermelissa Francis, a 27 y.o. female presents to the ED w/ complaint of n/v/d on 2/18 and 2/19. Pt denies any s/s today. Pt is AAOX4, VSS, and NADN.    Triage note:  Chief Complaint   Patient presents with    Vomiting    Diarrhea     Pt reports vomiting one time 02/18/2023 accompanied by an episode of diarrhea on 02/18/23 and once again the next two days. Pt denies n/v/d today, congestion, cough, or cp/sob     Review of patient's allergies indicates:  No Known Allergies  No past medical history on file.

## 2023-02-20 NOTE — Clinical Note
"Jaimie Gillis" Tito was seen and treated in our emergency department on 2/20/2023.  She may return to work on 02/21/2023.       If you have any questions or concerns, please don't hesitate to call.      Candis Denton RN    "

## 2023-02-20 NOTE — ED PROVIDER NOTES
"Encounter Date: 2/20/2023    SCRIBE #1 NOTE: I, Amaya Vargas, am scribing for, and in the presence of,  Catarino Nichole PA-C. I have scribed the following portions of the note - Other sections scribed: HPI, ROS.     History     Chief Complaint   Patient presents with    Vomiting    Diarrhea     Pt reports vomiting one time 02/18/2023 accompanied by an episode of diarrhea on 02/18/23 and once again the next two days. Pt denies n/v/d today, congestion, cough, or cp/sob     This 27 y.o female, with no medical history, presents to the ED c/o diarrhea and vomiting. Pt reports that she began to experience diarrhea, 1x episode of emesis, and abdominal cramping on Saturday (2/18/23) and states that the diarrhea persisted the following day. She notes that it is currently resolved, but reports that her "stomach feels bubbly" at this time. No abdominal pain currently. Pt reports also experiencing congestion and a cough (at night). No recent known sick contacts.  No consumption of unusual foods. She denies fever, dysuria, or urinary frequency. No other associated symptoms.     The history is provided by the patient.   Review of patient's allergies indicates:  No Known Allergies  No past medical history on file.  No past surgical history on file.  Family History   Problem Relation Age of Onset    No Known Problems Mother     No Known Problems Father     Diabetes Brother     Cancer Maternal Grandmother     Diabetes Maternal Grandmother     Hypertension Maternal Grandmother     Breast cancer Neg Hx     Colon cancer Neg Hx     Ovarian cancer Neg Hx     Arrhythmia Neg Hx     Cardiomyopathy Neg Hx     Congenital heart disease Neg Hx     Early death Neg Hx     Heart attacks under age 50 Neg Hx     Pacemaker/defibrilator Neg Hx     Premature birth Neg Hx      Social History     Tobacco Use    Smoking status: Never    Smokeless tobacco: Never   Substance Use Topics    Alcohol use: No    Drug use: No     Review of Systems "   Constitutional:  Negative for fever.   HENT:  Positive for congestion. Negative for sore throat.    Respiratory:  Positive for cough. Negative for shortness of breath.    Cardiovascular:  Negative for chest pain.   Gastrointestinal:  Positive for diarrhea and vomiting (1x episode Saturday). Negative for abdominal pain and nausea.   Genitourinary:  Negative for dysuria and frequency.   Musculoskeletal:  Negative for back pain.   Skin:  Negative for rash.   Neurological:  Negative for weakness.     Physical Exam     Initial Vitals [02/20/23 0926]   BP Pulse Resp Temp SpO2   135/84 72 15 98.4 °F (36.9 °C) 98 %      MAP       --         Physical Exam    Nursing note and vitals reviewed.  Constitutional: She appears well-developed and well-nourished. She is not diaphoretic. No distress.   HENT:   Head: Atraumatic.   Right Ear: External ear normal.   Left Ear: External ear normal.   Mouth/Throat: Oropharynx is clear and moist.   Eyes: Conjunctivae and EOM are normal.   Neck: No tracheal deviation present.   Normal range of motion.  Cardiovascular:  Normal rate and regular rhythm.           Pulmonary/Chest: No accessory muscle usage or stridor. No tachypnea. No respiratory distress.   Abdominal: Abdomen is soft. She exhibits no distension. There is no abdominal tenderness. There is no guarding.   Musculoskeletal:         General: No edema. Normal range of motion.      Cervical back: Normal range of motion.     Neurological: She is alert and oriented to person, place, and time. She displays no tremor. She displays no seizure activity. Coordination and gait normal.   Skin: Skin is intact. Capillary refill takes less than 2 seconds. No rash noted. No erythema.       ED Course   Procedures  Labs Reviewed   POCT URINE PREGNANCY          Imaging Results    None          Medications   ondansetron disintegrating tablet 8 mg (8 mg Oral Given 2/20/23 0953)   dicyclomine capsule 20 mg (20 mg Oral Given 2/20/23 0953)     Medical  Decision Making:   History:   Old Medical Records: I decided to obtain old medical records.  ED Management:  This is an emergent evaluation of a 27 y.o. female presenting to the ED for generalized abdominal pain associated with nausea, non-bloody/bilious vomiting, and non-bloody diarrhea. Denies fever, inability to tolerate PO, significant weight loss, recent hospitalization or use of antibiotics, or symptoms lasting greater than a week. Afebrile. Patient is non-toxic appearing and in no acute distress.    Symptoms improved. Appears well and is tolerating PO in ED. Vitals stable. Repeat abdominal exam benign.    Given rapid onset and characteristics of this patient's spectrum of symptoms, short duration of symptoms, and benign abdominal exam, patient likely has a variety of viral gastroenteritis vs. food poisoning. UPT negative. No signs of severe dehydration to suggest risk of BOOKER or significant electrolyte/metabolic disturbance today. No strong indication for imaging of abdomen at this time. No indication for stool studies today. Given the above, I have also considered but doubt IBD, infectious colitis, DKA, SBO, pancreatitis, acute cholecystitis, UTI, ureteral stone, and appendicitis.     Discharged home with supportive care. Advised maintaining adequate hydration and switching to a liquid diet; advising diet as tolerated. Instructed to follow up with PCP for reevaluation and management of symptoms.     I discussed with the patient the diagnosis, treatment plan, indications for return to the emergency department, and for expected follow-up. The patient verbalized an understanding. The patient is asked if there are any questions or concerns. We discuss the case, until all issues are addressed to the patient's satisfaction. Patient understands and is agreeable to the plan.        Scribe Attestation:   Scribe #1: I performed the above scribed service and the documentation accurately describes the services I  performed. I attest to the accuracy of the note.                 I, Catarino Nichole PA-C, personally performed the services described in this documentation. All medical record entries made by the scribe were at my direction and in my presence. I have reviewed the chart and agree that the record reflects my personal performance and is accurate and complete.   Clinical Impression:   Final diagnoses:  [R11.2, R19.7] Nausea vomiting and diarrhea (Primary)  [R10.84] Generalized abdominal cramping        ED Disposition Condition    Discharge Stable          ED Prescriptions       Medication Sig Dispense Start Date End Date Auth. Provider    dicyclomine (BENTYL) 20 mg tablet Take 1 tablet (20 mg total) by mouth 4 (four) times daily. for 3 days 12 tablet 2/20/2023 2/23/2023 Catarino Nichole PA-C    ondansetron (ZOFRAN) 4 MG tablet Take 2 tablets (8 mg total) by mouth every 6 (six) hours as needed for Nausea. 30 tablet 2/20/2023 3/22/2023 Catarino Nichole PA-C          Follow-up Information       Follow up With Specialties Details Why Contact Info    Uday Mendoza III, MD Internal Medicine Schedule an appointment as soon as possible for a visit in 1 day  [address] [address]  [place] [address]  [phone]      [facility] [place] - Emergency Dept Emergency Medicine Go to  If symptoms worsen [address]  [facility] [id number] [phone]             Catarino Nichole PA-C  02/20/23 7502

## 2023-02-20 NOTE — DISCHARGE INSTRUCTIONS

## 2023-09-25 ENCOUNTER — HOSPITAL ENCOUNTER (EMERGENCY)
Facility: HOSPITAL | Age: 28
Discharge: HOME | End: 2023-09-25
Attending: EMERGENCY MEDICINE
Payer: COMMERCIAL

## 2023-09-25 VITALS
SYSTOLIC BLOOD PRESSURE: 139 MMHG | WEIGHT: 250 LBS | DIASTOLIC BLOOD PRESSURE: 85 MMHG | HEART RATE: 91 BPM | OXYGEN SATURATION: 98 % | BODY MASS INDEX: 40.18 KG/M2 | RESPIRATION RATE: 18 BRPM | HEIGHT: 66 IN | TEMPERATURE: 99 F

## 2023-09-25 DIAGNOSIS — R19.7 NAUSEA VOMITING AND DIARRHEA: Primary | ICD-10-CM

## 2023-09-25 DIAGNOSIS — R11.2 NAUSEA VOMITING AND DIARRHEA: Primary | ICD-10-CM

## 2023-09-25 LAB
B-HCG UR QL: NEGATIVE
BILIRUB UR QL STRIP: NEGATIVE
CLARITY UR: CLEAR
COLOR UR: YELLOW
CTP QC/QA: YES
GLUCOSE UR QL STRIP: NEGATIVE
HGB UR QL STRIP: NEGATIVE
KETONES UR QL STRIP: NEGATIVE
LEUKOCYTE ESTERASE UR QL STRIP: NEGATIVE
NITRITE UR QL STRIP: NEGATIVE
PH UR STRIP: 8 [PH] (ref 5–8)
POC MOLECULAR INFLUENZA A AGN: NEGATIVE
POC MOLECULAR INFLUENZA B AGN: NEGATIVE
PROT UR QL STRIP: ABNORMAL
SARS-COV-2 RDRP RESP QL NAA+PROBE: NEGATIVE
SP GR UR STRIP: 1.02 (ref 1–1.03)
URN SPEC COLLECT METH UR: ABNORMAL
UROBILINOGEN UR STRIP-ACNC: ABNORMAL EU/DL

## 2023-09-25 PROCEDURE — 81003 URINALYSIS AUTO W/O SCOPE: CPT

## 2023-09-25 PROCEDURE — 87635 SARS-COV-2 COVID-19 AMP PRB: CPT

## 2023-09-25 PROCEDURE — 87502 INFLUENZA DNA AMP PROBE: CPT

## 2023-09-25 PROCEDURE — 99284 EMERGENCY DEPT VISIT MOD MDM: CPT

## 2023-09-25 PROCEDURE — 63600175 PHARM REV CODE 636 W HCPCS

## 2023-09-25 PROCEDURE — 81025 URINE PREGNANCY TEST: CPT

## 2023-09-25 PROCEDURE — 25000003 PHARM REV CODE 250

## 2023-09-25 PROCEDURE — 96372 THER/PROPH/DIAG INJ SC/IM: CPT

## 2023-09-25 RX ORDER — ONDANSETRON 4 MG/1
4 TABLET, ORALLY DISINTEGRATING ORAL 2 TIMES DAILY
Qty: 6 TABLET | Refills: 0 | Status: SHIPPED | OUTPATIENT
Start: 2023-09-25

## 2023-09-25 RX ORDER — PROMETHAZINE HYDROCHLORIDE 25 MG/1
25 TABLET ORAL EVERY 6 HOURS PRN
Qty: 8 TABLET | Refills: 0 | Status: SHIPPED | OUTPATIENT
Start: 2023-09-25 | End: 2023-09-27

## 2023-09-25 RX ORDER — DICYCLOMINE HYDROCHLORIDE 10 MG/ML
20 INJECTION INTRAMUSCULAR
Status: COMPLETED | OUTPATIENT
Start: 2023-09-25 | End: 2023-09-25

## 2023-09-25 RX ORDER — ONDANSETRON 4 MG/1
4 TABLET, ORALLY DISINTEGRATING ORAL
Status: COMPLETED | OUTPATIENT
Start: 2023-09-25 | End: 2023-09-25

## 2023-09-25 RX ADMIN — ONDANSETRON 4 MG: 4 TABLET, ORALLY DISINTEGRATING ORAL at 10:09

## 2023-09-25 RX ADMIN — DICYCLOMINE HYDROCHLORIDE 20 MG: 10 INJECTION, SOLUTION INTRAMUSCULAR at 10:09

## 2023-09-25 NOTE — Clinical Note
"Jaimie Gillis" Tito was seen and treated in our emergency department on 9/25/2023.  She may return to work on 09/27/2023.  Patient may return sooner if symptoms have resolved.     If you have any questions or concerns, please don't hesitate to call.      Ling Tidwell PA-C"

## 2023-09-26 NOTE — ED PROVIDER NOTES
Encounter Date: 9/25/2023       History     Chief Complaint   Patient presents with    Diarrhea     Reports diarrhea starting yesterday and new onset vomiting today.  Took pepto this morning with little relief.     28-year-old female with no past medical history presents with nausea and vomiting.  Patient states that yesterday she felt more tired than normal and nauseous.  She notes decreased appetite episodes of nausea or vomiting yesterday.  States she had a hardware from my [employer] night and noted increased feelings of nausea but no episodes of vomiting or diarrhea.  She states she woke up around 10:00 a.m. and ate a normal breakfast of eggs and coffee.  She noted some nausea after breakfast went to lay down and woke up afternoon around 1:00 p.m. with increased nausea.  She notes for episodes of nonbloody diarrhea and 2 episodes of nonbloody and nonbilious vomiting.  Patient, her boyfriend and grandmother ball of together and had similar foods over the last 24 hours all have the same symptoms.  She denies any fevers, body aches, urinary symptoms, CP, SOB and respiratory symptoms    The history is provided by the patient. No  was used.     Review of patient's allergies indicates:  No Known Allergies  No past medical history on file.  No past surgical history on file.  Family History   Problem Relation Age of Onset    No Known Problems Mother     No Known Problems Father     Diabetes Brother     Cancer Maternal Grandmother     Diabetes Maternal Grandmother     Hypertension Maternal Grandmother     Breast cancer Neg Hx     Colon cancer Neg Hx     Ovarian cancer Neg Hx     Arrhythmia Neg Hx     Cardiomyopathy Neg Hx     Congenital heart disease Neg Hx     Early death Neg Hx     Heart attacks under age 50 Neg Hx     Pacemaker/defibrilator Neg Hx     Premature birth Neg Hx      Social History     Tobacco Use    Smoking status: Never    Smokeless tobacco: Never   Substance Use Topics    Alcohol use: No     Drug use: No     Review of Systems   Constitutional:  Positive for fatigue. Negative for chills and fever.   HENT:  Negative for congestion, ear pain, nosebleeds, rhinorrhea, sore throat and trouble swallowing.    Eyes:  Negative for redness.   Respiratory:  Negative for cough, shortness of breath and stridor.    Cardiovascular:  Negative for chest pain.   Gastrointestinal:  Positive for abdominal pain, diarrhea, nausea and vomiting. Negative for abdominal distention and constipation.   Genitourinary:  Negative for decreased urine volume, dysuria, frequency, hematuria and urgency.   Musculoskeletal:  Negative for back pain and neck pain.   Skin:  Negative for rash and wound.   Neurological:  Negative for dizziness, speech difficulty, weakness, light-headedness, numbness and headaches.   Psychiatric/Behavioral:  Negative for confusion.        Physical Exam     Initial Vitals [09/25/23 2133]   BP Pulse Resp Temp SpO2   139/85 91 18 99 °F (37.2 °C) 98 %      MAP       --         Physical Exam    Nursing note and vitals reviewed.  Constitutional: She appears well-developed and well-nourished. She is not diaphoretic. No distress.   HENT:   Head: Normocephalic and atraumatic.   Right Ear: External ear normal.   Left Ear: External ear normal.   Nose: Nose normal.   Mouth/Throat: Oropharynx is clear and moist. No oropharyngeal exudate.   Eyes: Conjunctivae and EOM are normal. Pupils are equal, round, and reactive to light. Right eye exhibits no discharge. Left eye exhibits no discharge. No scleral icterus.   Neck: Neck supple. No tracheal deviation present.   Normal range of motion.  Cardiovascular:  Normal rate, regular rhythm, normal heart sounds and intact distal pulses.           Pulmonary/Chest: Breath sounds normal. No respiratory distress. She has no wheezes. She has no rhonchi. She has no rales. She exhibits no tenderness.   Abdominal: Abdomen is soft. She exhibits no distension and no mass. There is abdominal  tenderness.   Exam significant for epigastric tenderness    Abdomen otherwise is nondistended, soft and nontender in all other quadrants. Normoactive bowel sounds. Nonperitoneal, no guarding or rigidity. No palpable masses or hepatosplenomegaly.  No suprapubic pain. No CVAT.    No overlying skin changes. Distal pulses 2+ b/l.   (-)  Sands's sign.   (-)  Rebound Tenderness      There is no rebound and no guarding.   Musculoskeletal:         General: No tenderness or edema. Normal range of motion.      Cervical back: Normal range of motion and neck supple.     Lymphadenopathy:     She has no cervical adenopathy.   Neurological: She is alert and oriented to person, place, and time. She has normal strength. GCS score is 15. GCS eye subscore is 4. GCS verbal subscore is 5. GCS motor subscore is 6.   Skin: Skin is warm and dry. Capillary refill takes less than 2 seconds. No rash noted. No erythema.   Psychiatric: She has a normal mood and affect. Thought content normal.         ED Course   Procedures  Labs Reviewed   URINALYSIS, REFLEX TO URINE CULTURE - Abnormal; Notable for the following components:       Result Value    Protein, UA Trace (*)     Urobilinogen, UA 2.0-3.0 (*)     All other components within normal limits    Narrative:     Specimen Source->Urine   POCT URINE PREGNANCY   SARS-COV-2 RDRP GENE   POCT INFLUENZA A/B MOLECULAR          Imaging Results    None          Medications   ondansetron disintegrating tablet 4 mg (4 mg Oral Given 9/25/23 2237)   dicyclomine injection 20 mg (20 mg Intramuscular Given 9/25/23 2237)     Medical Decision Making  Pt is a 28 y.o.. year old female presenting with vomiting and diarrhea.  Differential Dx includes: gastroenteritis, viral syndrome, less likely UTI or appendicitis, or other intraabdominal infection as physical exam do not support these diagnosis.    Exam significant for epigastric tenderness. Abdomen otherwise is nondistended, soft and nontender in all other  quadrants. Normoactive bowel sounds. Nonperitoneal, no guarding or rigidity. No palpable masses or hepatosplenomegaly.  No suprapubic pain. No CVAT.    No overlying skin changes. Distal pulses 2+ b/l. (-)  Sands's sign. (-)  Rebound Tenderness.   Patient states she is currently nauseous. Pt treated with Zofran 4 mg PO and IM Bentyl. Will PO challenge.  Patient able to tolerate fluids without difficulty  COVID and flu negative.  UA with no signs of infection or red blood cells.    Pt re-evaluated, tolerating PO.  Abdominal exam unremarkable.  No distention or tenderness. Pt stable for discharge.   Discussed returning to the ED for persistent nausea/vomiting, severe worsening abdominal pain or chest pain, syncope/near syncope, fevers/chills. Follow up with primary care doctor if developing chronic diarrhea >1 week.    Amount and/or Complexity of Data Reviewed  External Data Reviewed: notes.  Labs: ordered. Decision-making details documented in ED Course.    Risk  OTC drugs.  Prescription drug management.  Diagnosis or treatment significantly limited by social determinants of health.               ED Course as of 09/25/23 2246   Mon Sep 25, 2023   2242 SARS-CoV-2 RNA, Amplification, Qual: Negative [CC]   2242 Preg Test, Ur: Negative [CC]   2242 POC Molecular Influenza A Ag: Negative [CC]   2242 POC Molecular Influenza B Ag: Negative [CC]   2243 Urinalysis, Reflex to Urine Culture Urine, Clean Catch(!)  UA with no signs of infection. [CC]      ED Course User Index  [CC] Ling Tidwell PA-C                    Clinical Impression:   Final diagnoses:  [R11.2, R19.7] Nausea vomiting and diarrhea (Primary)        ED Disposition Condition    Discharge Stable          ED Prescriptions       Medication Sig Dispense Start Date End Date Auth. Provider    ondansetron (ZOFRAN-ODT) 4 MG TbDL Take 1 tablet (4 mg total) by mouth 2 (two) times daily. 6 tablet 9/25/2023 -- Ling Tidwell PA-C    promethazine (PHENERGAN) 25 MG  tablet Take 1 tablet (25 mg total) by mouth every 6 (six) hours as needed for Nausea. 8 tablet 9/25/2023 9/27/2023 Ling Tidwell PA-C          Follow-up Information       Follow up With Specialties Details Why Contact Info    [facility] [place] Emergency Dept Emergency Medicine Go to  For new or worsening symptoms [address]  [facility] [id number]  [phone]    Uday Mendoza III, MD Internal Medicine   [address]  [place] [address]  [phone]               Ling Tidwell PA-C  09/25/23 7506       Ling Tidwell PA-C  09/25/23 2486

## 2023-09-26 NOTE — ED NOTES
Pt here for N/V/D for the past few hours, sig other is here with same symptoms.     LOC: Pt is awake alert and aware of environment, oriented X3 and speaking appropriately  Appearance: Pt is in no acute distress, Pt is well groomed and clean  Skin: skin is warm and dry with normal turgor, mucus membranes are moist and pink, skin is intact with no bruising or breakdown  Muskuloskeletal: Pt moves all extremities well, there is no obvious swelling or deformities noted, pulses are intact.  Respiratory: Airway is open and patent, respirations are spontaneous and even.  Cardiac:  no edema and cap refill is <3sec  Abdomen: soft, non-tender and non-distended  Neuro: Pt follows commands easily and has no obvious deficits

## 2023-09-26 NOTE — DISCHARGE INSTRUCTIONS
It is important that you stay hydrated.  Rest as needed.  Follow up with primary care if symptoms persist or worsen    Thank you for coming to our Emergency Department today. It is important to remember that some problems or medical conditions are difficult to diagnose and may not be found or addressed during your Emergency Department visit.  These conditions often start with non-specific symptoms and can only be diagnosed on follow up visits with your primary care physician or specialist when the symptoms continue or change. Please remember that all medical conditions can change, and we cannot predict how you will be feeling tomorrow or the next day. Return to the ER with any questions/concerns, new/concerning symptoms, worsening or failure to improve.       Be sure to follow up with your primary care doctor and review all labs/imaging/tests that were performed during your ER visit with them. It is very common for us to identify non-emergent incidental findings which must be followed up with your primary care physician.  Some labs/imaging/tests may be outside of the normal range, and require non-emergent follow-up and/or further investigation/treatment/procedures/testing to help diagnose/exclude/prevent complications or other potentially serious medical conditions. Some abnormalities may not have been discussed or addressed during your ER visit.     An ER visit does not replace a primary care visit, and many screening tests or follow-up tests cannot be ordered by an ER doctor or performed by the ER. Some tests may even require pre-approval.    If you do not have a primary care doctor, you may contact the one listed on your discharge paperwork or you may also call the [facility] Appointment Desk at [phone] , or [address] [place] [address] at  [phone] to schedule an appointment, or establish care with a primary care doctor or even a specialist and to obtain information about local resources. It is important to your  health that you have a primary care doctor.    Please take all medications as directed. We have done our best to select a medication for you that will treat your condition however, all medications may potentially have side-effects and it is impossible to predict which medications may give you side-effects or what those side-effects (if any) those medications may give you.  If you feel that you are having a negative effect or side-effect of any medication you should stop taking those medications immediately and seek medical attention. If you feel that you are having a life-threatening reaction call 911.        Do not drive, swim, climb to height, take a bath, operate heavy machinery, drink alcohol or take potentially sedating medications, sign any legal documents or make any important decisions for 24 hours if you have received any pain medications, sedatives or mood altering drugs during your ER visit or within 24 hours of taking them if they have been prescribed to you.     You can find additional resources for Dentists, hearing aids, durable medical equipment, low cost pharmacies and other resources at [email or web address]

## 2024-04-30 ENCOUNTER — HOSPITAL ENCOUNTER (EMERGENCY)
Facility: HOSPITAL | Age: 29
Discharge: HOME | End: 2024-04-30
Attending: EMERGENCY MEDICINE
Payer: COMMERCIAL

## 2024-04-30 VITALS
WEIGHT: 250 LBS | RESPIRATION RATE: 16 BRPM | OXYGEN SATURATION: 97 % | TEMPERATURE: 99 F | HEIGHT: 66 IN | HEART RATE: 74 BPM | SYSTOLIC BLOOD PRESSURE: 130 MMHG | BODY MASS INDEX: 40.18 KG/M2 | DIASTOLIC BLOOD PRESSURE: 80 MMHG

## 2024-04-30 DIAGNOSIS — R10.33 PERIUMBILICAL ABDOMINAL PAIN: Primary | ICD-10-CM

## 2024-04-30 LAB
B-HCG UR QL: NEGATIVE
BILIRUB UR QL STRIP: NEGATIVE
CLARITY UR: CLEAR
COLOR UR: COLORLESS
CTP QC/QA: YES
GLUCOSE UR QL STRIP: NEGATIVE
HGB UR QL STRIP: NEGATIVE
KETONES UR QL STRIP: NEGATIVE
LEUKOCYTE ESTERASE UR QL STRIP: NEGATIVE
NITRITE UR QL STRIP: NEGATIVE
PH UR STRIP: 7 [PH] (ref 5–8)
PROT UR QL STRIP: NEGATIVE
SP GR UR STRIP: 1.01 (ref 1–1.03)
URN SPEC COLLECT METH UR: ABNORMAL
UROBILINOGEN UR STRIP-ACNC: NEGATIVE EU/DL

## 2024-04-30 PROCEDURE — 81003 URINALYSIS AUTO W/O SCOPE: CPT

## 2024-04-30 PROCEDURE — 81025 URINE PREGNANCY TEST: CPT

## 2024-04-30 PROCEDURE — 25000003 PHARM REV CODE 250: Performed by: PHYSICIAN ASSISTANT

## 2024-04-30 PROCEDURE — 25000003 PHARM REV CODE 250

## 2024-04-30 PROCEDURE — 99283 EMERGENCY DEPT VISIT LOW MDM: CPT

## 2024-04-30 RX ORDER — FAMOTIDINE 20 MG/1
40 TABLET, FILM COATED ORAL
Status: COMPLETED | OUTPATIENT
Start: 2024-04-30 | End: 2024-04-30

## 2024-04-30 RX ORDER — ALUMINUM HYDROXIDE, MAGNESIUM HYDROXIDE, AND SIMETHICONE 1200; 120; 1200 MG/30ML; MG/30ML; MG/30ML
30 SUSPENSION ORAL EVERY 6 HOURS PRN
Qty: 769 ML | Refills: 0 | Status: SHIPPED | OUTPATIENT
Start: 2024-04-30 | End: 2025-04-30

## 2024-04-30 RX ORDER — ONDANSETRON 4 MG/1
4 TABLET, ORALLY DISINTEGRATING ORAL EVERY 8 HOURS PRN
Qty: 15 TABLET | Refills: 0 | Status: SHIPPED | OUTPATIENT
Start: 2024-04-30

## 2024-04-30 RX ORDER — ONDANSETRON 4 MG/1
4 TABLET, ORALLY DISINTEGRATING ORAL
Status: COMPLETED | OUTPATIENT
Start: 2024-04-30 | End: 2024-04-30

## 2024-04-30 RX ORDER — ACETAMINOPHEN 500 MG
1000 TABLET ORAL
Status: COMPLETED | OUTPATIENT
Start: 2024-04-30 | End: 2024-04-30

## 2024-04-30 RX ORDER — ACETAMINOPHEN 500 MG
1000 TABLET ORAL EVERY 6 HOURS PRN
Qty: 28 TABLET | Refills: 0 | Status: SHIPPED | OUTPATIENT
Start: 2024-04-30

## 2024-04-30 RX ORDER — FAMOTIDINE 20 MG/1
20 TABLET, FILM COATED ORAL 2 TIMES DAILY
Qty: 30 TABLET | Refills: 0 | Status: SHIPPED | OUTPATIENT
Start: 2024-04-30

## 2024-04-30 RX ORDER — ALUMINUM HYDROXIDE, MAGNESIUM HYDROXIDE, AND SIMETHICONE 1200; 120; 1200 MG/30ML; MG/30ML; MG/30ML
30 SUSPENSION ORAL
Status: COMPLETED | OUTPATIENT
Start: 2024-04-30 | End: 2024-04-30

## 2024-04-30 RX ADMIN — FAMOTIDINE 40 MG: 20 TABLET ORAL at 08:04

## 2024-04-30 RX ADMIN — ONDANSETRON 4 MG: 4 TABLET, ORALLY DISINTEGRATING ORAL at 08:04

## 2024-04-30 RX ADMIN — ACETAMINOPHEN 1000 MG: 500 TABLET ORAL at 08:04

## 2024-04-30 RX ADMIN — ALUMINUM HYDROXIDE, MAGNESIUM HYDROXIDE, AND SIMETHICONE 30 ML: 200; 200; 20 SUSPENSION ORAL at 08:04

## 2024-04-30 NOTE — Clinical Note
"Jaimie Gillis" Tito was seen and treated in our emergency department on 4/30/2024.  She may return to work on 05/02/2024.       If you have any questions or concerns, please don't hesitate to call.      Avinash Cuello PA-C" 3 year old female with no PMHx presenting for head injury that occurred at 4 PM today. As per mother at bedside, pt was in her usual state of health, no fevers, PO and UOP per normal. Pt was playing with brother, when she slipped and hit front of head on wooden floor. Unwitnessed. Mother heard cry and rushed into room. No LOC or AMS. After the event, pt was more tired and c/o headache. 3 hours after event, pt had 1 episode NBNB emesis. Mother became concerned and brought in for eval. Pt currently complaing of pain over left orbit    PMD: Jori  PMHx: headaches? (never seen neurology), ASD s/p repair, ECHo 2019 does not show residual flow  Meds: None  All: NKDA  PSHx: patch repair ASD 2018

## 2024-05-01 NOTE — ED PROVIDER NOTES
"Encounter Date: 4/30/2024    SCRIBE #1 NOTE: I, Virginie Koroma, am scribing for, and in the presence of,  Avinash Cuello PA-C. I have scribed the following portions of the note - Other sections scribed: HPI, ROS.       History     Chief Complaint   Patient presents with    Abdominal Pain    Nausea     Patient arrives with pain to abdomen, located in umbilical area that radiates to left lower abdomen, pain is intermittent. She has also been having nausea, especially when she stands up. Denies NVD, but has been having loose stools.      29 y. o. female with no pertinent PMHx who presents to the ED for a chief complaint of intermittent "aching and sharp" epigastric abdominal pain onset a day ago. She reports her abdominal pain has gotten worse after she woke up this morning, and radiates to her LLQ sometimes. Further reports associated Sx of nausea and diarrhea onset this morning. Patient also reports her nausea is exacerbated when she stands or moves around. Patient states she is usually unable to tolerate spicy foods, and reports eating a spicy dish at [employer] 2 day ago on her birthday. She has taken Pepto bismol for her Sx without relief. No other alleviating or exacerbating factors. Denies any recent abdominal surgery. Further denies any vomiting, or other associated symptoms. NKDA.    The history is provided by the patient. No  was used.     Review of patient's allergies indicates:  No Known Allergies  No past medical history on file.  No past surgical history on file.  Family History   Problem Relation Name Age of Onset    No Known Problems Mother      No Known Problems Father      Diabetes Brother      Cancer Maternal Grandmother      Diabetes Maternal Grandmother      Hypertension Maternal Grandmother      Breast cancer Neg Hx      Colon cancer Neg Hx      Ovarian cancer Neg Hx      Arrhythmia Neg Hx      Cardiomyopathy Neg Hx      Congenital heart disease Neg Hx      " Early death Neg Hx      Heart attacks under age 50 Neg Hx      Pacemaker/defibrilator Neg Hx      Premature birth Neg Hx       Social History     Tobacco Use    Smoking status: Never    Smokeless tobacco: Never   Substance Use Topics    Alcohol use: No    Drug use: No     Review of Systems   Constitutional:  Negative for chills and fever.   HENT:  Negative for facial swelling and sore throat.    Eyes:  Negative for visual disturbance.   Respiratory:  Negative for cough and shortness of breath.    Cardiovascular:  Negative for chest pain and palpitations.   Gastrointestinal:  Positive for abdominal pain (intermittent, epigastric), diarrhea and nausea. Negative for constipation and vomiting.   Genitourinary:  Negative for dysuria and hematuria.   Musculoskeletal:  Negative for back pain.   Skin:  Negative for rash.   Neurological:  Negative for weakness and headaches.   Hematological:  Does not bruise/bleed easily.   Psychiatric/Behavioral: Negative.         Physical Exam     Initial Vitals [04/30/24 1911]   BP Pulse Resp Temp SpO2   120/65 81 16 98.5 °F (36.9 °C) 99 %      MAP       --         Physical Exam    Nursing note and vitals reviewed.  Constitutional: Vital signs are normal. She appears well-developed and well-nourished. She is cooperative. She does not appear ill. No distress.   Clinically well-appearing.  No acute distress.  Alert, oriented, interactive.  Does not appear ill or toxic.   HENT:   Head: Normocephalic and atraumatic.   Right Ear: Hearing and external ear normal.   Left Ear: Hearing and external ear normal.   Nose: Nose normal.   Eyes: Conjunctivae and EOM are normal.   Neck: Phonation normal.   Normal range of motion.  Cardiovascular:  Normal rate and regular rhythm.           No murmur heard.  No tachycardia.  No friction rub.   Pulmonary/Chest: Effort normal. No respiratory distress.   Even and unlabored.   Abdominal: Abdomen is soft. She exhibits no distension. There is no abdominal  tenderness.   No surgical scars.  Abdomen soft, nondistended.  Mild tenderness to deep palpation in the epigastrium. Negative Sands's sign.  No tenderness over McBurney's point.  Negative Rovsing sign.  No rebound or guarding.  No tenderness to percussion.  Not a surgical abdomen.   Musculoskeletal:      Cervical back: Normal range of motion.     Neurological: She is alert and oriented to person, place, and time. GCS eye subscore is 4. GCS verbal subscore is 5. GCS motor subscore is 6.   Skin: Skin is warm. Capillary refill takes less than 2 seconds.         ED Course   Procedures  Labs Reviewed   URINALYSIS, REFLEX TO URINE CULTURE - Abnormal; Notable for the following components:       Result Value    Color, UA Colorless (*)     All other components within normal limits    Narrative:     Specimen Source->Urine   POCT URINE PREGNANCY          Imaging Results    None          Medications   ondansetron disintegrating tablet 4 mg (4 mg Oral Given 4/30/24 2002)   aluminum-magnesium hydroxide-simethicone 200-200-20 mg/5 mL suspension 30 mL (30 mLs Oral Given 4/30/24 2048)   acetaminophen tablet 1,000 mg (1,000 mg Oral Given 4/30/24 2048)   famotidine tablet 40 mg (40 mg Oral Given 4/30/24 2048)     Medical Decision Making  29-year-old female presenting to the emergency department with a chief complaint of abdominal pain for the last 2 days.  Intermittent, periumbilical, sometimes radiating over to the left.  Associated nausea and diarrhea without vomiting. Ate some spicy food a few days ago, does not normally eat spicy food.  Denies fever, focal pain, vomiting, diarrhea.  On physical exam, clinically well-appearing and in no acute distress.  Vital signs within normal limits.  Reassuring abdominal exam with only very mild tenderness to deep palpation in the epigastrium.  Cardiac and lung exams within normal limits.    Differential diagnosis is broad and includes but is not limited to gastroenteritis, appendicitis,  pancreatitis, cholecystitis, diverticulitis, peritonitis, small-bowel obstruction, epiploic appendagitis, constipation, urinary tract infection including cystitis or pyelonephritis, ovarian torsion, ruptured ovarian cyst, pregnancy, ectopic pregnancy, tubo-ovarian abscess, dysmenorrhea, pelvic inflammatory disease, sexually transmitted infection, vaginitis, cervicitis, musculoskeletal pain.     Reassuring exam.  Only mildly tender in the epigastrium.  Vitals normal, afebrile, no tachycardia.  UPT negative, UA within normal limits.  After exam and history, I have a very low suspicious for any acute focal infectious or inflammatory process requiring emergent workup or intervention at this time.  No labs or imaging were ordered.  I will attempt symptomatic management with Tylenol, Maalox, Pepcid, Zofran.  Patient tolerated these medications well, no nausea or vomiting.  Patient states that her abdominal pain was starting to get better and she was ready to go home.  I will discharge her home with the identical medications I gave her today in the emergency department.  Return precautions were discussed at length and in detail.  Follow up with primary care.    Return precautions were discussed, all patient questions were answered, and the patient was agreeable to the plan of care.  She was discharged home in stable condition and will follow up with her primary care provider or return to the emergency department if her symptoms worsen or do not improve.     Amount and/or Complexity of Data Reviewed  Labs: ordered. Decision-making details documented in ED Course.    Risk  OTC drugs.  Prescription drug management.  Diagnosis or treatment significantly limited by social determinants of health.            Scribe Attestation:   Scribe #1: I performed the above scribed service and the documentation accurately describes the services I performed. I attest to the accuracy of the note.    Scribe attestation: I, Avinash Cuello PA-C,  personally performed the services described in this documentation. All medical record entries made by the scribe were at my direction and in my presence.  I have reviewed the chart and agree that the record reflects my personal performance and is accurate and complete.                             Clinical Impression:  Final diagnoses:  [R10.33] Periumbilical abdominal pain (Primary)          ED Disposition Condition    Discharge Stable          ED Prescriptions       Medication Sig Dispense Start Date End Date Auth. Provider    ondansetron (ZOFRAN-ODT) 4 MG TbDL Take 1 tablet (4 mg total) by mouth every 8 (eight) hours as needed (nausea). 15 tablet 4/30/2024 -- Avinash Cuello PA-C    acetaminophen (TYLENOL) 500 MG tablet Take 2 tablets (1,000 mg total) by mouth every 6 (six) hours as needed. 28 tablet 4/30/2024 -- Avinash Cuello PA-C    aluminum-magnesium hydroxide-simethicone (MAALOX ADVANCED) 200-200-20 mg/5 mL Susp Take 30 mLs by mouth every 6 (six) hours as needed. 769 mL 4/30/2024 4/30/2025 Avinash Cuello PA-C    famotidine (PEPCID) 20 MG tablet Take 1 tablet (20 mg total) by mouth 2 (two) times daily. 30 tablet 4/30/2024 -- Avinash Cuello PA-C          Follow-up Information       Follow up With Specialties Details Why Contact Info    Uday Mendoza III, MD Internal Medicine Schedule an appointment as soon as possible for a visit  As needed, If symptoms worsen [address]  [facility]  [place] [address]  [phone]      [facility] - Emergency Dept Emergency Medicine Go to  If symptoms worsen [address] [place] [facility] [place] [id number] [phone]             Avinash Cuello PA-C  04/30/24 5714

## 2024-05-01 NOTE — DISCHARGE INSTRUCTIONS

## 2024-05-01 NOTE — ED TRIAGE NOTES
Pt presents to ED with complaint of abdominal pain accompanied by nausea x2 days. Pt denies vomiting.

## 2025-01-18 ENCOUNTER — HOSPITAL ENCOUNTER (EMERGENCY)
Facility: HOSPITAL | Age: 30
Discharge: HOME | End: 2025-01-18
Attending: EMERGENCY MEDICINE
Payer: COMMERCIAL

## 2025-01-18 VITALS
BODY MASS INDEX: 36.96 KG/M2 | HEIGHT: 66 IN | SYSTOLIC BLOOD PRESSURE: 127 MMHG | WEIGHT: 230 LBS | TEMPERATURE: 98 F | HEART RATE: 60 BPM | DIASTOLIC BLOOD PRESSURE: 57 MMHG | RESPIRATION RATE: 18 BRPM | OXYGEN SATURATION: 95 %

## 2025-01-18 DIAGNOSIS — K30 INDIGESTION: Primary | ICD-10-CM

## 2025-01-18 DIAGNOSIS — L73.1 INGROWN HAIR: ICD-10-CM

## 2025-01-18 DIAGNOSIS — R07.9 CHEST PAIN: ICD-10-CM

## 2025-01-18 LAB
ALBUMIN SERPL BCP-MCNC: 3.8 G/DL (ref 3.5–5.2)
ALP SERPL-CCNC: 42 U/L (ref 40–150)
ALT SERPL W/O P-5'-P-CCNC: 15 U/L (ref 10–44)
ANION GAP SERPL CALC-SCNC: 8 MMOL/L (ref 8–16)
AST SERPL-CCNC: 16 U/L (ref 10–40)
B-HCG UR QL: NEGATIVE
BASOPHILS # BLD AUTO: 0.03 K/UL (ref 0–0.2)
BASOPHILS NFR BLD: 0.5 % (ref 0–1.9)
BILIRUB SERPL-MCNC: 0.3 MG/DL (ref 0.1–1)
BNP SERPL-MCNC: 26 PG/ML (ref 0–99)
BUN SERPL-MCNC: 7 MG/DL (ref 6–20)
CALCIUM SERPL-MCNC: 10.5 MG/DL (ref 8.7–10.5)
CHLORIDE SERPL-SCNC: 105 MMOL/L (ref 95–110)
CO2 SERPL-SCNC: 26 MMOL/L (ref 23–29)
CREAT SERPL-MCNC: 0.8 MG/DL (ref 0.5–1.4)
CTP QC/QA: YES
DIFFERENTIAL METHOD BLD: NORMAL
EOSINOPHIL # BLD AUTO: 0.2 K/UL (ref 0–0.5)
EOSINOPHIL NFR BLD: 2.7 % (ref 0–8)
ERYTHROCYTE [DISTWIDTH] IN BLOOD BY AUTOMATED COUNT: 12.1 % (ref 11.5–14.5)
EST. GFR  (NO RACE VARIABLE): >60 ML/MIN/1.73 M^2
GLUCOSE SERPL-MCNC: 89 MG/DL (ref 70–110)
HCT VFR BLD AUTO: 40.2 % (ref 37–48.5)
HGB BLD-MCNC: 13.1 G/DL (ref 12–16)
IMM GRANULOCYTES # BLD AUTO: 0.01 K/UL (ref 0–0.04)
IMM GRANULOCYTES NFR BLD AUTO: 0.2 % (ref 0–0.5)
LYMPHOCYTES # BLD AUTO: 2.4 K/UL (ref 1–4.8)
LYMPHOCYTES NFR BLD: 43.4 % (ref 18–48)
MCH RBC QN AUTO: 28.7 PG (ref 27–31)
MCHC RBC AUTO-ENTMCNC: 32.6 G/DL (ref 32–36)
MCV RBC AUTO: 88 FL (ref 82–98)
MONOCYTES # BLD AUTO: 0.4 K/UL (ref 0.3–1)
MONOCYTES NFR BLD: 7.8 % (ref 4–15)
NEUTROPHILS # BLD AUTO: 2.5 K/UL (ref 1.8–7.7)
NEUTROPHILS NFR BLD: 45.4 % (ref 38–73)
NRBC BLD-RTO: 0 /100 WBC
PLATELET # BLD AUTO: 297 K/UL (ref 150–450)
PMV BLD AUTO: 9.5 FL (ref 9.2–12.9)
POTASSIUM SERPL-SCNC: 4.1 MMOL/L (ref 3.5–5.1)
PROT SERPL-MCNC: 7.9 G/DL (ref 6–8.4)
RBC # BLD AUTO: 4.57 M/UL (ref 4–5.4)
SODIUM SERPL-SCNC: 139 MMOL/L (ref 136–145)
TROPONIN I SERPL DL<=0.01 NG/ML-MCNC: <0.006 NG/ML (ref 0–0.03)
WBC # BLD AUTO: 5.49 K/UL (ref 3.9–12.7)

## 2025-01-18 PROCEDURE — 63600175 PHARM REV CODE 636 W HCPCS

## 2025-01-18 PROCEDURE — 25000003 PHARM REV CODE 250

## 2025-01-18 PROCEDURE — 83880 ASSAY OF NATRIURETIC PEPTIDE: CPT

## 2025-01-18 PROCEDURE — 84484 ASSAY OF TROPONIN QUANT: CPT

## 2025-01-18 PROCEDURE — 99284 EMERGENCY DEPT VISIT MOD MDM: CPT | Mod: 25

## 2025-01-18 PROCEDURE — 93010 ELECTROCARDIOGRAM REPORT: CPT | Mod: ,,, | Performed by: INTERNAL MEDICINE

## 2025-01-18 PROCEDURE — 93005 ELECTROCARDIOGRAM TRACING: CPT

## 2025-01-18 PROCEDURE — 81025 URINE PREGNANCY TEST: CPT | Performed by: PHYSICIAN ASSISTANT

## 2025-01-18 PROCEDURE — 80053 COMPREHEN METABOLIC PANEL: CPT

## 2025-01-18 PROCEDURE — 96374 THER/PROPH/DIAG INJ IV PUSH: CPT

## 2025-01-18 PROCEDURE — 85025 COMPLETE CBC W/AUTO DIFF WBC: CPT

## 2025-01-18 PROCEDURE — 96375 TX/PRO/DX INJ NEW DRUG ADDON: CPT

## 2025-01-18 RX ORDER — LIDOCAINE HYDROCHLORIDE 20 MG/ML
15 SOLUTION OROPHARYNGEAL ONCE
Status: COMPLETED | OUTPATIENT
Start: 2025-01-18 | End: 2025-01-18

## 2025-01-18 RX ORDER — ALUMINUM HYDROXIDE, MAGNESIUM HYDROXIDE, AND SIMETHICONE 1200; 120; 1200 MG/30ML; MG/30ML; MG/30ML
30 SUSPENSION ORAL ONCE
Status: COMPLETED | OUTPATIENT
Start: 2025-01-18 | End: 2025-01-18

## 2025-01-18 RX ORDER — ONDANSETRON 4 MG/1
4 TABLET, ORALLY DISINTEGRATING ORAL 2 TIMES DAILY
Qty: 12 TABLET | Refills: 0 | Status: SHIPPED | OUTPATIENT
Start: 2025-01-18 | End: 2025-01-18 | Stop reason: SDUPTHER

## 2025-01-18 RX ORDER — ONDANSETRON 4 MG/1
4 TABLET, ORALLY DISINTEGRATING ORAL EVERY 8 HOURS PRN
Qty: 15 TABLET | Refills: 0 | Status: SHIPPED | OUTPATIENT
Start: 2025-01-18

## 2025-01-18 RX ORDER — ONDANSETRON HYDROCHLORIDE 2 MG/ML
8 INJECTION, SOLUTION INTRAVENOUS
Status: COMPLETED | OUTPATIENT
Start: 2025-01-18 | End: 2025-01-18

## 2025-01-18 RX ORDER — FAMOTIDINE 10 MG/ML
20 INJECTION INTRAVENOUS
Status: COMPLETED | OUTPATIENT
Start: 2025-01-18 | End: 2025-01-18

## 2025-01-18 RX ORDER — FAMOTIDINE 20 MG/1
20 TABLET, FILM COATED ORAL 2 TIMES DAILY
Qty: 30 TABLET | Refills: 0 | Status: SHIPPED | OUTPATIENT
Start: 2025-01-18

## 2025-01-18 RX ORDER — OMEPRAZOLE 20 MG/1
20 CAPSULE, DELAYED RELEASE ORAL DAILY
Qty: 30 CAPSULE | Refills: 0 | Status: SHIPPED | OUTPATIENT
Start: 2025-01-18 | End: 2025-02-17

## 2025-01-18 RX ADMIN — ONDANSETRON 8 MG: 2 INJECTION INTRAMUSCULAR; INTRAVENOUS at 01:01

## 2025-01-18 RX ADMIN — LIDOCAINE HYDROCHLORIDE 15 ML: 20 SOLUTION ORAL at 01:01

## 2025-01-18 RX ADMIN — FAMOTIDINE 20 MG: 10 INJECTION, SOLUTION INTRAVENOUS at 01:01

## 2025-01-18 RX ADMIN — ALUMINUM HYDROXIDE, MAGNESIUM HYDROXIDE, AND SIMETHICONE 30 ML: 1200; 120; 1200 SUSPENSION ORAL at 01:01

## 2025-01-18 NOTE — ED PROVIDER NOTES
"Encounter Date: 1/18/2025       History     Chief Complaint   Patient presents with    Chest Pain     Pt with no PMHx, presents w/ c/o intermittent, non-radiating, mid-sternal chest pain  and epigastric pain since last pm. "I was belching a lot", Tums taken w/ no relief from either.  +nausea, Denies SOB.  No cough or trauma.      29-year-old female with no past medical history presents to the emergency department complaining of chest pain that started yesterday.  She describes the pain as burning in the middle of her chest.  She also complains of belching.  Patient states that her chest pain does not worsen on exertion.  It comes and goes and is worse after eating greasy foods.  Patient states she took Tums yesterday that minimally relieve her symptoms.  She also drank some tea before bed that improved her symptoms.  Patient states the chest pain returned after drinking coffee this morning.  Associated symptoms include nausea but no vomiting.  Denies shortness of breath, fever, vomiting, diarrhea, constipation, abdominal pain.  Patient also complains of a boil on her vagina.  Patient states she noticed this 2 days ago and wants to make sure it is uninfected.  Denies significant erythema or warmth in the area.  Patient states it is not painful.  NKDA.        Review of patient's allergies indicates:  No Known Allergies  History reviewed. No pertinent past medical history.  History reviewed. No pertinent surgical history.  Family History   Problem Relation Name Age of Onset    No Known Problems Mother      No Known Problems Father      Diabetes Brother      Cancer Maternal Grandmother      Diabetes Maternal Grandmother      Hypertension Maternal Grandmother      Breast cancer Neg Hx      Colon cancer Neg Hx      Ovarian cancer Neg Hx      Arrhythmia Neg Hx      Cardiomyopathy Neg Hx      Congenital heart disease Neg Hx      Early death Neg Hx      Heart attacks under age 50 Neg Hx      Pacemaker/defibrilator Neg Hx   "    Premature birth Neg Hx       Social History     Tobacco Use    Smoking status: Never    Smokeless tobacco: Never   Substance Use Topics    Alcohol use: No    Drug use: No     Review of Systems   Constitutional:  Negative for fever.   HENT:  Negative for sore throat.    Respiratory:  Negative for shortness of breath.    Cardiovascular:  Positive for chest pain. Negative for leg swelling.   Gastrointestinal:  Positive for nausea. Negative for abdominal pain, blood in stool, constipation, diarrhea and vomiting.   Genitourinary:  Negative for dysuria, vaginal bleeding, vaginal discharge and vaginal pain.   Musculoskeletal:  Negative for back pain.   Skin:  Negative for rash.        Boil     Neurological:  Negative for syncope, weakness and light-headedness.   Hematological:  Does not bruise/bleed easily.       Physical Exam     Initial Vitals [01/18/25 1159]   BP Pulse Resp Temp SpO2   134/65 62 18 98.1 °F (36.7 °C) 98 %      MAP       --         Physical Exam    Nursing note and vitals reviewed.  Constitutional: She appears well-developed and well-nourished.   HENT:   Head: Normocephalic and atraumatic.   Right Ear: External ear normal.   Left Ear: External ear normal.   Nose: Nose normal. Mouth/Throat: Oropharynx is clear and moist.   Eyes: Conjunctivae and lids are normal.   Neck: Trachea normal. Neck supple.   Normal range of motion.  Cardiovascular:  Normal rate, regular rhythm, S1 normal, S2 normal, normal heart sounds and normal pulses.     Exam reveals no gallop and no friction rub.       No murmur heard.  Pulmonary/Chest: Effort normal and breath sounds normal. She has no decreased breath sounds. She has no wheezes. She has no rhonchi. She has no rales.   Abdominal: Abdomen is soft. Bowel sounds are normal. She exhibits no distension. There is no abdominal tenderness.   No right CVA tenderness.  No left CVA tenderness. There is no rebound, no guarding, no tenderness at McBurney's point and negative Sands's  sign.   Genitourinary:    Genitourinary Comments: Small ingrown hair on outside of vagina.  No erythema, swelling, warmth.  No tenderness to palpation     Musculoskeletal:         General: Normal range of motion.      Cervical back: Normal range of motion and neck supple.      Comments: Patient is able to move all extremities. 5/5 strength at upper and lower extremities.        Lymphadenopathy:     She has no cervical adenopathy.   Neurological: She is alert. She has normal strength. No cranial nerve deficit or sensory deficit.   Skin: Skin is warm and dry.   Psychiatric: She has a normal mood and affect.         ED Course   Procedures  Labs Reviewed   CBC W/ AUTO DIFFERENTIAL       Result Value    WBC 5.49      RBC 4.57      Hemoglobin 13.1      Hematocrit 40.2      MCV 88      MCH 28.7      MCHC 32.6      RDW 12.1      Platelets 297      MPV 9.5      Immature Granulocytes 0.2      Gran # (ANC) 2.5      Immature Grans (Abs) 0.01      Lymph # 2.4      Mono # 0.4      Eos # 0.2      Baso # 0.03      nRBC 0      Gran % 45.4      Lymph % 43.4      Mono % 7.8      Eosinophil % 2.7      Basophil % 0.5      Differential Method Automated     COMPREHENSIVE METABOLIC PANEL    Sodium 139      Potassium 4.1      Chloride 105      CO2 26      Glucose 89      BUN 7      Creatinine 0.8      Calcium 10.5      Total Protein 7.9      Albumin 3.8      Total Bilirubin 0.3      Alkaline Phosphatase 42      AST 16      ALT 15      eGFR >60      Anion Gap 8     TROPONIN I    Troponin I <0.006     B-TYPE NATRIURETIC PEPTIDE    BNP 26     POCT URINE PREGNANCY    POC Preg Test, Ur Negative       Acceptable Yes       EKG Readings: (Independently Interpreted)   Initial Reading: No STEMI. Rhythm: Normal Sinus Rhythm. Ectopy: No Ectopy. Conduction: Normal.       Imaging Results    None          Medications   aluminum-magnesium hydroxide-simethicone 200-200-20 mg/5 mL suspension 30 mL (30 mLs Oral Given 1/18/25 3296)     And    LIDOcaine viscous HCl 2% oral solution 15 mL (15 mLs Oral Given 1/18/25 1327)   famotidine (PF) injection 20 mg (20 mg Intravenous Given 1/18/25 1329)   ondansetron injection 8 mg (8 mg Intravenous Given 1/18/25 1328)     Medical Decision Making  Encounter Date: 1/18/2025    29 y.o. female presents for evaluation of chest pain.  Hemodynamically stable. Afebrile. Phonating and protecting the airway spontaneously. No clinical evidence for cardiovascular instability or impending airway compromise.  Remainder of physical exam as above and unremarkable.  Prior medical records reviewed. PMD note reviewed. Current co-morbidities considered that will impact clinical decision making include as above.   Vitals range:   Temp:  [98.1 °F (36.7 °C)] 98.1 °F (36.7 °C)  Pulse:  [60-62] 60  Resp:  [18] 18  SpO2:  [95 %-98 %] 95 %  BP: (127-134)/(57-65) 127/57    Differential diagnoses includes but is not limited to:   PE: normal rate, no sob/recent immovilization/surgery/travel/family history  Pneumonia: chest xray negative. No fever or leukoscytosis. No cough and lungs non consistent with pna  Tamponade: unlikely due to EKG and stable vitals  STEMI: No STEMI on ekg  Dissection: equal pulses bilaterally and no ripping chest pain to the back  Esophageal rupture: no dysphagia or vomiting   Arrhythmia: no arrhythmia on ekg  CHF: no fluid overload on physical exam.  BNP within normal limits  Pneumothorax: bilateral breath sounds on physical exam    Patient admits to complete resolution of symptoms after GI cocktail, Pepcid, Zofran.  Considering onset of symptoms after caffeine intake and after eating a hot and spicy breakfast sandwich, symptoms most likely are due to GERD.  Patient does not have abdominal tenderness.  Patient is tolerating p.o..    I will discharge patient home with omeprazole and Zofran.  Advised patient to follow up with GI.  Informational handout was given about dyspepsia.    Clinical picture today most consistent  with GERD.   Doubt alternate pathology as listed in the differential above.    ED MEDS GIVEN:  Medications  aluminum-magnesium hydroxide-simethicone 200-200-20 mg/5 mL suspension 30 mL (30 mLs Oral Given 1/18/25 1327)    And  LIDOcaine viscous HCl 2% oral solution 15 mL (15 mLs Oral Given 1/18/25 1327)  famotidine (PF) injection 20 mg (20 mg Intravenous Given 1/18/25 1329)  ondansetron injection 8 mg (8 mg Intravenous Given 1/18/25 1328)    Clinical Impression:  Final diagnoses:  [R07.9] Chest pain  [K30] Indigestion (Primary)  [L73.1] Ingrown hair    I discussed with the patient/family the diagnosis, treatment plan, indications for return to the emergency department, and for expected follow-up. The patient/family verbalized an understanding. The patient/family is asked if there are any questions or concerns. We discuss the case, until all issues are addressed to the patient/family's satisfaction. Patient/family understands and is agreeable to the plan.   Quan Hidalgo    DISCLAIMER: This note was prepared with [employer] voice recognition transcription software. Garbled syntax, mangled pronouns, and other bizarre constructions may be attributed to that software system.      Amount and/or Complexity of Data Reviewed  Labs: ordered.    Risk  OTC drugs.  Prescription drug management.                                      Clinical Impression:  Final diagnoses:  [R07.9] Chest pain  [K30] Indigestion (Primary)  [L73.1] Ingrown hair          ED Disposition Condition    Discharge Stable          ED Prescriptions       Medication Sig Dispense Start Date End Date Auth. Provider    ondansetron (ZOFRAN-ODT) 4 MG TbDL  (Status: Discontinued) Take 1 tablet (4 mg total) by mouth 2 (two) times daily. 12 tablet 1/18/2025 1/18/2025 Quan Hidalgo PA-C    famotidine (PEPCID) 20 MG tablet Take 1 tablet (20 mg total) by mouth 2 (two) times daily. 30 tablet 1/18/2025 -- Quan Hidalgo PA-C    ondansetron (ZOFRAN-ODT) 4 MG TbDL  Take 1 tablet (4 mg total) by mouth every 8 (eight) hours as needed (nausea). 15 tablet 1/18/2025 -- Quan Hidalgo PA-C    omeprazole (PRILOSEC) 20 MG capsule Take 1 capsule (20 mg total) by mouth once daily. 30 capsule 1/18/2025 2/17/2025 Quan Hidalgo PA-C          Follow-up Information       Follow up With Specialties Details Why Contact Info    Uday Mendoza III, MD Internal Medicine Schedule an appointment as soon as possible for a visit in 1 day For further evaluation, more definitive management of symptoms [address] [facility] [address]  [facility]  [facility] [address]  [phone]      [facility] GASTROENTEROLOGY Gastroenterology Schedule an appointment as soon as possible for a visit in 1 day For further evaluation, more definitive management of symptoms [address] [facility] [place] [id number] [phone]             Quan Hidalgo PA-C  01/18/25 2023

## 2025-01-18 NOTE — DISCHARGE INSTRUCTIONS
Take medications as prescribed.  Follow up with primary care doctor and GI doctor for further evaluation symptoms.  Use warm compresses on ingrown hair.  Thank you for coming to our Emergency Department today. It is important to remember that some problems or medical conditions are difficult to diagnose and may not be found or addressed during your Emergency Department visit.  These conditions often start with non-specific symptoms and can only be diagnosed on follow up visits with your primary care physician or specialist when the symptoms continue or change. Please remember that all medical conditions can change, and we cannot predict how you will be feeling tomorrow or the next day. Return to the ER with any questions/concerns, new/concerning symptoms, worsening or failure to improve.       Be sure to follow up with your primary care doctor and review all labs/imaging/tests that were performed during your ER visit with them. It is very common for us to identify non-emergent incidental findings which must be followed up with your primary care physician.  Some labs/imaging/tests may be outside of the normal range, and require non-emergent follow-up and/or further investigation/treatment/procedures/testing to help diagnose/exclude/prevent complications or other potentially serious medical conditions. Some abnormalities may not have been discussed or addressed during your ER visit. Some lab results may not return during your ER visit but can be accessible by downloading the free [facility] Mychart jm or by visiting [email or web address] . It is important for you to review all labs/imaging/tests which are outside of the normal range with your physician.    An ER visit does not replace a primary care visit, and many screening tests or follow-up tests cannot be ordered by an ER doctor or performed by the ER. Some tests may even require pre-approval.    If you do not have a primary care doctor, you may contact the one  listed on your discharge paperwork or you may also call the [facility] Appointment Desk at [phone] , or [facility] at  [phone] to schedule an appointment, or establish care with a primary care doctor or even a specialist and to obtain information about local resources. It is important to your health that you have a primary care doctor.    Please take all medications as directed. We have done our best to select a medication for you that will treat your condition however, all medications may potentially have side-effects and it is impossible to predict which medications may give you side-effects or what those side-effects (if any) those medications may give you.  If you feel that you are having a negative effect or side-effect of any medication you should stop taking those medications immediately and seek medical attention. If you feel that you are having a life-threatening reaction call 911.        Do not drive, swim, climb to height, take a bath, operate heavy machinery, drink alcohol or take potentially sedating medications, sign any legal documents or make any important decisions for 24 hours if you have received any pain medications, sedatives or mood altering drugs during your ER visit or within 24 hours of taking them if they have been prescribed to you.     You can find additional resources for Dentists, hearing aids, durable medical equipment, low cost pharmacies and other resources at [email or web address]

## 2025-01-24 LAB
OHS QRS DURATION: 68 MS
OHS QTC CALCULATION: 427 MS

## (undated) DEVICE — PAD ABD 8X10 STERILE